# Patient Record
Sex: FEMALE | Race: WHITE | Employment: FULL TIME | RURAL
[De-identification: names, ages, dates, MRNs, and addresses within clinical notes are randomized per-mention and may not be internally consistent; named-entity substitution may affect disease eponyms.]

---

## 2017-01-03 ENCOUNTER — TELEPHONE (OUTPATIENT)
Dept: FAMILY MEDICINE CLINIC | Age: 62
End: 2017-01-03

## 2017-01-03 ENCOUNTER — OFFICE VISIT (OUTPATIENT)
Dept: FAMILY MEDICINE CLINIC | Age: 62
End: 2017-01-03

## 2017-01-03 VITALS
HEART RATE: 70 BPM | BODY MASS INDEX: 20.62 KG/M2 | OXYGEN SATURATION: 97 % | HEIGHT: 64 IN | SYSTOLIC BLOOD PRESSURE: 115 MMHG | DIASTOLIC BLOOD PRESSURE: 53 MMHG | WEIGHT: 120.8 LBS | RESPIRATION RATE: 18 BRPM | TEMPERATURE: 96.7 F

## 2017-01-03 DIAGNOSIS — R31.9 HEMATURIA: Primary | ICD-10-CM

## 2017-01-03 DIAGNOSIS — G47.00 INSOMNIA, UNSPECIFIED TYPE: ICD-10-CM

## 2017-01-03 DIAGNOSIS — J30.89 PERENNIAL ALLERGIC RHINITIS, UNSPECIFIED ALLERGIC RHINITIS TRIGGER: ICD-10-CM

## 2017-01-03 DIAGNOSIS — M81.0 OSTEOPOROSIS: ICD-10-CM

## 2017-01-03 DIAGNOSIS — I10 ESSENTIAL HYPERTENSION: ICD-10-CM

## 2017-01-03 DIAGNOSIS — Z12.11 COLON CANCER SCREENING: ICD-10-CM

## 2017-01-03 DIAGNOSIS — E55.9 VITAMIN D DEFICIENCY: ICD-10-CM

## 2017-01-03 DIAGNOSIS — E78.5 HYPERLIPIDEMIA, UNSPECIFIED HYPERLIPIDEMIA TYPE: ICD-10-CM

## 2017-01-03 DIAGNOSIS — R31.9 BLOOD IN URINE: Primary | ICD-10-CM

## 2017-01-03 LAB
BILIRUB UR QL STRIP: NEGATIVE
GLUCOSE UR-MCNC: NEGATIVE MG/DL
KETONES P FAST UR STRIP-MCNC: NEGATIVE MG/DL
PH UR STRIP: 6.5 [PH] (ref 4.6–8)
PROT UR QL STRIP: NEGATIVE MG/DL
SP GR UR STRIP: 1.02 (ref 1–1.03)
UA UROBILINOGEN AMB POC: NORMAL (ref 0.2–1)
URINALYSIS CLARITY POC: CLEAR
URINALYSIS COLOR POC: NORMAL
URINE BLOOD POC: NORMAL
URINE LEUKOCYTES POC: NEGATIVE
URINE NITRITES POC: NEGATIVE

## 2017-01-03 RX ORDER — FLUTICASONE PROPIONATE 50 MCG
SPRAY, SUSPENSION (ML) NASAL
Qty: 3 BOTTLE | Refills: 5 | Status: SHIPPED | OUTPATIENT
Start: 2017-01-03 | End: 2018-11-27 | Stop reason: SDUPTHER

## 2017-01-03 RX ORDER — TRAZODONE HYDROCHLORIDE 50 MG/1
50 TABLET ORAL
Qty: 30 TAB | Refills: 5 | Status: SHIPPED | OUTPATIENT
Start: 2017-01-03 | End: 2017-07-21 | Stop reason: SDUPTHER

## 2017-01-03 NOTE — PROGRESS NOTES
Chief Complaint   Patient presents with    Blood in Urine     patient has no symptoms, but has blood in urine     Labs     patient needs labs for up coming annual visit later in the month     Patient is fasting today for labs. Mckay Medina LPN

## 2017-01-03 NOTE — PROGRESS NOTES
HISTORY OF PRESENT ILLNESS  Thu Stevens is a 59 y.o. female. Chief Complaint   Patient presents with    Blood in Urine     patient has no symptoms, but has blood in urine     Labs     patient needs labs for up coming annual visit later in the month       HPI  Seeing Dr Zara Primrose Urologist in [place] for years  Had frequent UTI's in the past  Was on Macrobid every day then  Got off    Did not have any sy  But had BW done for [employer]  BW good for Chol  But urine had Leukocytes and blood in it     Here for recheck  And was told to get a CT scan  From new Urologist  But did not get ABX    Also her for rest of BW    Osteoporosis  Not taking Boniva  Scared of SE      Review of Systems   Constitutional: Negative for fever and weight loss. Respiratory: Negative for cough and shortness of breath. Gastrointestinal: Negative for abdominal pain, blood in stool, constipation, diarrhea, melena, nausea and vomiting. Genitourinary: Positive for flank pain. Negative for dysuria, frequency, hematuria and urgency. No vaginal bleeding     Past Medical History   Diagnosis Date    BCC (basal cell carcinoma of skin)     Cervical stenosis of spinal canal     Chronic UTI     Hypercholesterolemia      Hyperlipidemia    Hypertension     MVP (mitral valve prolapse)      Tricuspid regurgitation    Osteoporosis 2015.01.02     bone density exam     Current Outpatient Prescriptions   Medication Sig Dispense Refill    traZODone (DESYREL) 50 mg tablet Take 1 Tab by mouth nightly. 30 Tab 5    fluticasone (FLONASE) 50 mcg/actuation nasal spray 2 spray each nostril daily 3 Bottle 5    metoprolol succinate (TOPROL-XL) 25 mg XL tablet Take 1 Tab by mouth daily. 90 Tab 2    simvastatin (ZOCOR) 10 mg tablet Take 1 Tab by mouth nightly. 90 Tab 0    lisinopril (PRINIVIL, ZESTRIL) 5 mg tablet Take 5 mg by mouth.  multivitamin (ONE A DAY) tablet Take 1 tablet by mouth daily.       zolpidem (AMBIEN) 5 mg tablet 1/2 - 1 qhs prn 30 tablet 0    ibandronate (BONIVA) 150 mg tablet Take 150 mg by mouth every thirty (30) days. 3 Tab 3     No Known Allergies  Visit Vitals    /53 (BP 1 Location: Left arm, BP Patient Position: Sitting)    Pulse 70    Temp 96.7 °F (35.9 °C) (Temporal)    Resp 18    Ht 5' 4\" (1.626 m)    Wt 120 lb 12.8 oz (54.8 kg)    SpO2 97%    BMI 20.74 kg/m2       Physical Exam   Constitutional: She is oriented to person, place, and time. She appears well-developed and well-nourished. No distress. HENT:   Head: Normocephalic and atraumatic. Mouth/Throat: Oropharynx is clear and moist. No oropharyngeal exudate. Eyes: Conjunctivae and EOM are normal.   Cardiovascular: Normal rate and regular rhythm. Pulmonary/Chest: Effort normal and breath sounds normal.   Abdominal: She exhibits no distension. There is no tenderness. Musculoskeletal: She exhibits no edema. Lymphadenopathy:     She has no cervical adenopathy. Neurological: She is alert and oriented to person, place, and time. Skin: Skin is warm and dry. Psychiatric: She has a normal mood and affect. Nursing note and vitals reviewed. Recent Results (from the past 12 hour(s))   AMB POC URINALYSIS DIP STICK AUTO W/O MICRO    Collection Time: 01/03/17  9:27 AM   Result Value Ref Range    Color (UA POC) Dark Yellow     Clarity (UA POC) Clear     Glucose (UA POC) Negative Negative    Bilirubin (UA POC) Negative Negative    Ketones (UA POC) Negative Negative    Specific gravity (UA POC) 1.020 1.001 - 1.035    Blood (UA POC) Trace Negative    pH (UA POC) 6.5 4.6 - 8.0    Protein (UA POC) Negative Negative mg/dL    Urobilinogen (UA POC) 0.2 mg/dL 0.2 - 1    Nitrites (UA POC) Negative Negative    Leukocyte esterase (UA POC) Negative Negative       ASSESSMENT and PLAN    ICD-10-CM ICD-9-CM    1. Blood in urine R31.9 599.70 AMB POC URINALYSIS DIP STICK AUTO W/O MICRO      CT ABD PELV WO CONT      CULTURE, URINE   2.  Essential hypertension I10 144.6 METABOLIC PANEL, BASIC      CBC WITH AUTOMATED DIFF   3. Hyperlipidemia, unspecified hyperlipidemia type E78.5 272.4    4. Vitamin D deficiency E55.9 268.9 VITAMIN D, 25 HYDROXY   5. Insomnia, unspecified type G47.00 780.52 traZODone (DESYREL) 50 mg tablet   6. Perennial allergic rhinitis, unspecified allergic rhinitis trigger J30.89 477.8 fluticasone (FLONASE) 50 mcg/actuation nasal spray   7. Osteoporosis M81.0 733.00 DEXA BONE DENSITY STUDY AXIAL   8.  Colon cancer screening Z12.11 V76.51 OCCULT BLOOD, IMMUNOASSAY (FIT)

## 2017-01-03 NOTE — MR AVS SNAPSHOT
Visit Information Date & Time Provider Department Dept. Phone Encounter #  
 1/3/2017  9:20 AM Yanet Dial MD [address] [place] [address] [phone] [id number] Your Appointments 1/26/2017  8:00 AM  
ESTABLISHED PATIENT with Yanet Dial MD  
[address] [place] [address] ([facility]) Appt Note: hm due $25 CP mbm  
 [address] [facility] [address] [facility] [address]  [phone]  
[phone]  
  
   
 [address] [address] Upcoming Health Maintenance Date Due FOBT Q 1 YEAR AGE 50-75 11/23/2016 ZOSTER VACCINE AGE 60> 6/24/2017* BREAST CANCER SCRN MAMMOGRAM 8/25/2017 PAP AKA CERVICAL CYTOLOGY 10/28/2018 DTaP/Tdap/Td series (2 - Td) 11/19/2025 *Topic was postponed. The date shown is not the original due date. Allergies as of 1/3/2017  Review Complete On: 1/3/2017 By: Ace Joseph LPN No Known Allergies Current Immunizations  Reviewed on 11/19/2015 Name Date Td 6/27/2005 Tdap 11/19/2015 Not reviewed this visit You Were Diagnosed With   
  
 Codes Comments Blood in urine    -  Primary ICD-10-CM: R31.9 ICD-9-CM: 599.70 Essential hypertension     ICD-10-CM: I10 
ICD-9-CM: 401.9 Hyperlipidemia, unspecified hyperlipidemia type     ICD-10-CM: E78.5 ICD-9-CM: 272.4 Vitamin D deficiency     ICD-10-CM: E55.9 ICD-9-CM: 268.9 Insomnia, unspecified type     ICD-10-CM: G47.00 ICD-9-CM: 780.52 Perennial allergic rhinitis, unspecified allergic rhinitis trigger     ICD-10-CM: J30.89 ICD-9-CM: 477.8 Osteoporosis     ICD-10-CM: M81.0 ICD-9-CM: 733.00 Colon cancer screening     ICD-10-CM: Z12.11 ICD-9-CM: V76.51 Vitals BP Pulse Temp Resp Height(growth percentile) 115/53 (BP 1 Location: Left arm, BP Patient Position: Sitting) 70 96.7 °F (35.9 °C) (Temporal) 18 5' 4\" (1.626 m) Weight(growth percentile) SpO2 BMI OB Status Smoking Status 120 lb 12.8 oz (54.8 kg) 97% 20.74 kg/m2 Postmenopausal Never Smoker Vitals History BMI and BSA Data Body Mass Index Body Surface Area 20.74 kg/m 2 1.57 m 2 Preferred Pharmacy Pharmacy Name Phone [address] [place] [address],[address] [place] [address]  [phone] Your Updated Medication List  
  
   
This list is accurate as of: 1/3/17 10:25 AM.  Always use your most recent med list.  
  
  
  
  
 fluticasone 50 mcg/actuation nasal spray Commonly known as:  FLONASE  
2 spray each nostril daily  
  
 ibandronate 150 mg tablet Commonly known as:  Wayne Night Take 150 mg by mouth every thirty (30) days. lisinopril 5 mg tablet Commonly known as:  Maurizio Reed Take 5 mg by mouth.  
  
 metoprolol succinate 25 mg XL tablet Commonly known as:  TOPROL-XL Take 1 Tab by mouth daily. multivitamin tablet Commonly known as:  ONE A DAY Take 1 tablet by mouth daily. simvastatin 10 mg tablet Commonly known as:  ZOCOR Take 1 Tab by mouth nightly. traZODone 50 mg tablet Commonly known as:  [place] Askew Take 1 Tab by mouth nightly. zolpidem 5 mg tablet Commonly known as:  AMBIEN  
1/2 - 1 qhs prn  
  
  
  
  
Prescriptions Sent to Pharmacy Refills  
 traZODone (DESYREL) 50 mg tablet 5 Sig: Take 1 Tab by mouth nightly. Class: Normal  
 Pharmacy: [address] [place] [address]  Ph #: [phone] Route: Oral  
 fluticasone (FLONASE) 50 mcg/actuation nasal spray 5 Si spray each nostril daily Class: Normal  
 Pharmacy: [address] [place] [address] Dr Ph #: [phone] To-Do List   
 2017 Imaging:  CT ABD PELV WO CONT   
  
 2017 Imaging:  DEXA BONE DENSITY STUDY AXIAL   
  
 2017 Lab:  OCCULT BLOOD, IMMUNOASSAY (FIT) Introducing [facility] & HEALTH SERVICES!    
 [facility] introduces [email or web address] patient portal. Now you can access parts of your medical record, email your doctor's office, and request medication refills online. 1. In your internet browser, go to [email or web address] 2. Click on the First Time User? Click Here link in the Sign In box. You will see the New Member Sign Up page. 3. Enter your [employer] Access Code exactly as it appears below. You will not need to use this code after youve completed the sign-up process. If you do not sign up before the expiration date, you must request a new code. · [employer] Access Code: [email or web address] Expires: 4/3/2017  8:59 AM 
 
4. Enter the last four digits of your Social Security Number (xxxx) and Date of Birth (mm/dd/yyyy) as indicated and click Submit. You will be taken to the next sign-up page. 5. Create a [employer] ID. This will be your [employer] login ID and cannot be changed, so think of one that is secure and easy to remember. 6. Create a [employer] password. You can change your password at any time. 7. Enter your Password Reset Question and Answer. This can be used at a later time if you forget your password. 8. Enter your e-mail address. You will receive e-mail notification when new information is available in [address] 9. Click Sign Up. You can now view and download portions of your medical record. 10. Click the Download Summary menu link to download a portable copy of your medical information. If you have questions, please visit the Frequently Asked Questions section of the [employer] website. Remember, [employer] is NOT to be used for urgent needs. For medical emergencies, dial 911. Now available from your iPhone and Android! Please provide this summary of care documentation to your next provider. Your primary care clinician is listed as Terri Durán. If you have any questions after today's visit, please call [phone].

## 2017-01-04 LAB
25(OH)D3+25(OH)D2 SERPL-MCNC: 40.3 NG/ML (ref 30–100)
BASOPHILS # BLD AUTO: 0 X10E3/UL (ref 0–0.2)
BASOPHILS NFR BLD AUTO: 1 %
BUN SERPL-MCNC: 10 MG/DL (ref 8–27)
BUN/CREAT SERPL: 15 (ref 11–26)
CALCIUM SERPL-MCNC: 8.8 MG/DL (ref 8.7–10.3)
CHLORIDE SERPL-SCNC: 103 MMOL/L (ref 96–106)
CO2 SERPL-SCNC: 24 MMOL/L (ref 18–29)
CREAT SERPL-MCNC: 0.65 MG/DL (ref 0.57–1)
EOSINOPHIL # BLD AUTO: 0.2 X10E3/UL (ref 0–0.4)
EOSINOPHIL NFR BLD AUTO: 4 %
ERYTHROCYTE [DISTWIDTH] IN BLOOD BY AUTOMATED COUNT: 13.4 % (ref 12.3–15.4)
GLUCOSE SERPL-MCNC: 101 MG/DL (ref 65–99)
HCT VFR BLD AUTO: 40.6 % (ref 34–46.6)
HGB BLD-MCNC: 13.8 G/DL (ref 11.1–15.9)
IMM GRANULOCYTES # BLD: 0 X10E3/UL (ref 0–0.1)
IMM GRANULOCYTES NFR BLD: 0 %
LYMPHOCYTES # BLD AUTO: 1.6 X10E3/UL (ref 0.7–3.1)
LYMPHOCYTES NFR BLD AUTO: 36 %
MCH RBC QN AUTO: 31.4 PG (ref 26.6–33)
MCHC RBC AUTO-ENTMCNC: 34 G/DL (ref 31.5–35.7)
MCV RBC AUTO: 93 FL (ref 79–97)
MONOCYTES # BLD AUTO: 0.5 X10E3/UL (ref 0.1–0.9)
MONOCYTES NFR BLD AUTO: 11 %
NEUTROPHILS # BLD AUTO: 2.2 X10E3/UL (ref 1.4–7)
NEUTROPHILS NFR BLD AUTO: 48 %
PLATELET # BLD AUTO: 178 X10E3/UL (ref 150–379)
POTASSIUM SERPL-SCNC: 5.2 MMOL/L (ref 3.5–5.2)
RBC # BLD AUTO: 4.39 X10E6/UL (ref 3.77–5.28)
SODIUM SERPL-SCNC: 142 MMOL/L (ref 134–144)
WBC # BLD AUTO: 4.6 X10E3/UL (ref 3.4–10.8)

## 2017-01-04 NOTE — PROGRESS NOTES
Call pt, the kidney tests and electrolytes are  Normal  The CBC is normal  The Vit D is normal  Cont current meds, recheck in 1 y

## 2017-01-05 ENCOUNTER — TELEPHONE (OUTPATIENT)
Dept: FAMILY MEDICINE CLINIC | Age: 62
End: 2017-01-05

## 2017-01-06 DIAGNOSIS — R31.9 BLOOD IN URINE: ICD-10-CM

## 2017-01-06 LAB
BACTERIA UR CULT: ABNORMAL
BACTERIA UR CULT: ABNORMAL

## 2017-01-06 RX ORDER — AMOXICILLIN 875 MG/1
875 TABLET, FILM COATED ORAL 2 TIMES DAILY
Qty: 14 TAB | Refills: 0 | Status: SHIPPED | OUTPATIENT
Start: 2017-01-06 | End: 2017-04-18 | Stop reason: ALTCHOICE

## 2017-01-11 DIAGNOSIS — M81.0 OSTEOPOROSIS: ICD-10-CM

## 2017-04-18 ENCOUNTER — OFFICE VISIT (OUTPATIENT)
Dept: FAMILY MEDICINE CLINIC | Age: 62
End: 2017-04-18

## 2017-04-18 VITALS
OXYGEN SATURATION: 98 % | BODY MASS INDEX: 20.32 KG/M2 | WEIGHT: 119 LBS | DIASTOLIC BLOOD PRESSURE: 75 MMHG | HEIGHT: 64 IN | RESPIRATION RATE: 14 BRPM | TEMPERATURE: 96 F | HEART RATE: 55 BPM | SYSTOLIC BLOOD PRESSURE: 128 MMHG

## 2017-04-18 DIAGNOSIS — H11.32 SUBCONJUNCTIVAL HEMORRHAGE, NON-TRAUMATIC, LEFT: Primary | ICD-10-CM

## 2017-04-18 DIAGNOSIS — G47.00 INSOMNIA, UNSPECIFIED TYPE: ICD-10-CM

## 2017-04-18 DIAGNOSIS — G47.25 JET LAG: ICD-10-CM

## 2017-04-18 DIAGNOSIS — H57.89 REDNESS OF EYE, LEFT: ICD-10-CM

## 2017-04-18 RX ORDER — ZOLPIDEM TARTRATE 5 MG/1
TABLET ORAL
Qty: 30 TAB | Refills: 0 | Status: SHIPPED | OUTPATIENT
Start: 2017-04-18 | End: 2017-11-10 | Stop reason: SDUPTHER

## 2017-04-18 RX ORDER — CIPROFLOXACIN HYDROCHLORIDE 3.5 MG/ML
SOLUTION/ DROPS TOPICAL
Qty: 5 ML | Refills: 0 | Status: SHIPPED | OUTPATIENT
Start: 2017-04-18 | End: 2017-11-10 | Stop reason: ALTCHOICE

## 2017-04-18 NOTE — MR AVS SNAPSHOT
Visit Information Date & Time Provider Department Dept. Phone Encounter #  
 4/18/2017  1:30 PM Shahid Hampton PA-C [address] [id number] Upcoming Health Maintenance Date Due FOBT Q 1 YEAR AGE 50-75 11/23/2016 ZOSTER VACCINE AGE 60> 6/24/2017* BREAST CANCER SCRN MAMMOGRAM 8/25/2017 PAP AKA CERVICAL CYTOLOGY 10/28/2018 DTaP/Tdap/Td series (2 - Td) 11/19/2025 *Topic was postponed. The date shown is not the original due date. Allergies as of 4/18/2017  Review Complete On: 4/18/2017 By: Shahid Hampton PA-C No Known Allergies Current Immunizations  Reviewed on 11/19/2015 Name Date Td 6/27/2005 Tdap 11/19/2015 Not reviewed this visit You Were Diagnosed With   
  
 Codes Comments Subconjunctival hemorrhage, non-traumatic, left    -  Primary ICD-10-CM: H11.32 
ICD-9-CM: 372.72 Redness of eye, left     ICD-10-CM: H57.8 ICD-9-CM: 379.93   
 Jet lag     ICD-10-CM: G47.25 ICD-9-CM: 327.35 Insomnia, unspecified type     ICD-10-CM: G47.00 ICD-9-CM: 780.52 Vitals BP Pulse Temp Resp Height(growth percentile) Weight(growth percentile) 128/75 (BP 1 Location: Right arm, BP Patient Position: Sitting) (!) 55 96 °F (35.6 °C) (Temporal) 14 5' 4\" (1.626 m) 119 lb (54 kg) SpO2 BMI OB Status Smoking Status 98% 20.43 kg/m2 Postmenopausal Never Smoker BMI and BSA Data Body Mass Index Body Surface Area  
 20.43 kg/m 2 1.56 m 2 Preferred Pharmacy Pharmacy Name Phone [facility],[address], [address] [place] [address]  [phone] Your Updated Medication List  
  
   
This list is accurate as of: 4/18/17  1:54 PM.  Always use your most recent med list.  
  
  
  
  
 ciprofloxacin HCl 0.3 % ophthalmic solution Commonly known as:  CILOXIN  
2 gtt in eye(s) q2h while awake x 2 days, then q4h x 5 days. fluticasone 50 mcg/actuation nasal spray Commonly known as:  FLONASE  
2 spray each nostril daily  
  
 ibandronate 150 mg tablet Commonly known as:  Polina Loni Take 150 mg by mouth every thirty (30) days. lisinopril 5 mg tablet Commonly known as:  Bret Daft Take 5 mg by mouth.  
  
 metoprolol succinate 25 mg XL tablet Commonly known as:  TOPROL-XL Take 1 Tab by mouth daily. multivitamin tablet Commonly known as:  ONE A DAY Take 1 tablet by mouth daily. simvastatin 10 mg tablet Commonly known as:  ZOCOR Take 1 Tab by mouth nightly. traZODone 50 mg tablet Commonly known as:  Rajiv [place] Take 1 Tab by mouth nightly. zolpidem 5 mg tablet Commonly known as:  AMBIEN  
1/2 - 1 qhs prn  
  
  
  
  
Prescriptions Printed Refills  
 zolpidem (AMBIEN) 5 mg tablet 0 Si/2 - 1 qhs prn Class: Print Prescriptions Sent to Pharmacy Refills  
 ciprofloxacin HCl (CILOXIN) 0.3 % ophthalmic solution 0 Si gtt in eye(s) q2h while awake x 2 days, then q4h x 5 days. Class: Normal  
 Pharmacy: [address] [place] [address],[address], [address] [place] [address] Dr Amador #: [phone] Patient Instructions Insomnia: Care Instructions Your Care Instructions Insomnia is the inability to sleep well. It is a common problem for most people at some time. Insomnia may make it hard for you to get to sleep, stay asleep, or sleep as long as you need to. This can make you tired and grouchy during the day. It can also make you forgetful, less effective at work, and unhappy. Insomnia can be caused by conditions such as depression or anxiety. Pain can also affect your ability to sleep. When these problems are solved, the insomnia usually clears up. But sometimes bad sleep habits can cause insomnia. If insomnia is affecting your work or your enjoyment of life, you can take steps to improve your sleep. Follow-up care is a key part of your treatment and safety.  Be sure to make and go to all appointments, and call your doctor if you are having problems. It's also a good idea to know your test results and keep a list of the medicines you take. How can you care for yourself at home? What to avoid · Do not have drinks with caffeine, such as coffee or black tea, for 8 hours before bed. · Do not smoke or use other types of tobacco near bedtime. Nicotine is a stimulant and can keep you awake. · Avoid drinking alcohol late in the evening, because it can cause you to wake in the middle of the night. · Do not eat a big meal close to bedtime. If you are hungry, eat a light snack. · Do not drink a lot of water close to bedtime, because the need to urinate may wake you up during the night. · Do not read or watch TV in bed. Use the bed only for sleeping and sexual activity. What to try · Go to bed at the same time every night, and wake up at the same time every morning. Do not take naps during the day. · Keep your bedroom quiet, dark, and cool. · Sleep on a comfortable pillow and mattress. · If watching the clock makes you anxious, turn it facing away from you so you cannot see the time. · If you worry when you lie down, start a worry book. Well before bedtime, write down your worries, and then set the book and your concerns aside. · Try meditation or other relaxation techniques before you go to bed. · If you cannot fall asleep, get up and go to another room until you feel sleepy. Do something relaxing. Repeat your bedtime routine before you go to bed again. · Make your house quiet and calm about an hour before bedtime. Turn down the lights, turn off the TV, log off the computer, and turn down the volume on music. This can help you relax after a busy day. When should you call for help? Watch closely for changes in your health, and be sure to contact your doctor if: 
· Your efforts to improve your sleep do not work. · Your insomnia gets worse. · You have been feeling down, depressed, or hopeless or have lost interest in things that you usually enjoy. Where can you learn more? Go to http://atul-chika.info/. Enter P513 in the search box to learn more about \"Insomnia: Care Instructions. \" Current as of: July 26, 2016 Content Version: 11.2 © 3120-1783 [employer]. Care instructions adapted under license by [employer] (which disclaims liability or warranty for this information). If you have questions about a medical condition or this instruction, always ask your healthcare professional. [place] [address] any warranty or liability for your use of this information. Subconjunctival Hemorrhage: Care Instructions Your Care Instructions Sometimes small blood vessels in the white of the eye can break, causing a red spot or speck. This is called a subconjunctival hemorrhage. The blood vessels may break when you sneeze, cough, vomit, strain, or bend over. Sometimes there is no clear cause. The blood may look alarming, especially if the spot is large. If there is no pain or vision change, there is usually no reason to worry, and the blood slowly will go away on its own in 2 to 3 weeks. Follow-up care is a key part of your treatment and safety. Be sure to make and go to all appointments, and call your doctor if you are having problems. Its also a good idea to know your test results and keep a list of the medicines you take. How can you care for yourself at home? · Watch for changes in your eye. It is normal for the red spot on your eyeball to change color as it heals. Just like a bruise on your skin, it may change from red to brown to purple to yellow. · Do not take aspirin or products that contain aspirin, which can increase bleeding. Use acetaminophen (Tylenol) if you need pain relief for another problem.  
· Do not take two or more pain medicines at the same time unless the doctor told you to. Many pain medicines have acetaminophen, which is Tylenol. Too much acetaminophen (Tylenol) can be harmful. When should you call for help? Call your doctor now or seek immediate medical care if: 
· You see blood over the black part of your eye (pupil). · You have any changes or problems in your vision. · You have any pain in your eye. · You have any discharge from your eye. Watch closely for changes in your health, and be sure to contact your doctor if: 
· Your eye color is not steadily returning to normal. 
· The blood has not gone away after 2 to 3 weeks. · You develop bruising or bleeding elsewhere, such as the gums or the skin, or you have nosebleeds. Where can you learn more? Go to http://atul-chika.info/. Enter K224 in the search box to learn more about \"Subconjunctival Hemorrhage: Care Instructions. \" Current as of: May 23, 2016 Content Version: 11.2 © 8195-8676 [employer]. Care instructions adapted under license by [employer] (which disclaims liability or warranty for this information). If you have questions about a medical condition or this instruction, always ask your healthcare professional. [place] [address] any warranty or liability for your use of this information. Introducing [facility] & HEALTH SERVICES! [facility] introduces iWant[employer] patient portal. Now you can access parts of your medical record, email your doctor's office, and request medication refills online. 1. In your internet browser, go to [email or web address] 2. Click on the First Time User? Click Here link in the Sign In box. You will see the New Member Sign Up page. 3. Enter your [employer] Access Code exactly as it appears below. You will not need to use this code after youve completed the sign-up process. If you do not sign up before the expiration date, you must request a new code. · [employer] Access Code: [email or web address] Expires: 7/17/2017  1:54 PM 
 
4. Enter the last four digits of your Social Security Number (xxxx) and Date of Birth (mm/dd/yyyy) as indicated and click Submit. You will be taken to the next sign-up page. 5. Create a [employer] ID. This will be your [employer] login ID and cannot be changed, so think of one that is secure and easy to remember. 6. Create a [employer] password. You can change your password at any time. 7. Enter your Password Reset Question and Answer. This can be used at a later time if you forget your password. 8. Enter your e-mail address. You will receive e-mail notification when new information is available in [address] 9. Click Sign Up. You can now view and download portions of your medical record. 10. Click the Download Summary menu link to download a portable copy of your medical information. If you have questions, please visit the Frequently Asked Questions section of the [employer] website. Remember, [employer] is NOT to be used for urgent needs. For medical emergencies, dial 911. Now available from your iPhone and Android! Please provide this summary of care documentation to your next provider. Your primary care clinician is listed as Terri Durán. If you have any questions after today's visit, please call [phone].

## 2017-04-18 NOTE — PATIENT INSTRUCTIONS
Insomnia: Care Instructions  Your Care Instructions  Insomnia is the inability to sleep well. It is a common problem for most people at some time. Insomnia may make it hard for you to get to sleep, stay asleep, or sleep as long as you need to. This can make you tired and grouchy during the day. It can also make you forgetful, less effective at work, and unhappy. Insomnia can be caused by conditions such as depression or anxiety. Pain can also affect your ability to sleep. When these problems are solved, the insomnia usually clears up. But sometimes bad sleep habits can cause insomnia. If insomnia is affecting your work or your enjoyment of life, you can take steps to improve your sleep. Follow-up care is a key part of your treatment and safety. Be sure to make and go to all appointments, and call your doctor if you are having problems. It's also a good idea to know your test results and keep a list of the medicines you take. How can you care for yourself at home? What to avoid  · Do not have drinks with caffeine, such as coffee or black tea, for 8 hours before bed. · Do not smoke or use other types of tobacco near bedtime. Nicotine is a stimulant and can keep you awake. · Avoid drinking alcohol late in the evening, because it can cause you to wake in the middle of the night. · Do not eat a big meal close to bedtime. If you are hungry, eat a light snack. · Do not drink a lot of water close to bedtime, because the need to urinate may wake you up during the night. · Do not read or watch TV in bed. Use the bed only for sleeping and sexual activity. What to try  · Go to bed at the same time every night, and wake up at the same time every morning. Do not take naps during the day. · Keep your bedroom quiet, dark, and cool. · Sleep on a comfortable pillow and mattress. · If watching the clock makes you anxious, turn it facing away from you so you cannot see the time.   · If you worry when you lie down, start a worry book. Well before bedtime, write down your worries, and then set the book and your concerns aside. · Try meditation or other relaxation techniques before you go to bed. · If you cannot fall asleep, get up and go to another room until you feel sleepy. Do something relaxing. Repeat your bedtime routine before you go to bed again. · Make your house quiet and calm about an hour before bedtime. Turn down the lights, turn off the TV, log off the computer, and turn down the volume on music. This can help you relax after a busy day. When should you call for help? Watch closely for changes in your health, and be sure to contact your doctor if:  · Your efforts to improve your sleep do not work. · Your insomnia gets worse. · You have been feeling down, depressed, or hopeless or have lost interest in things that you usually enjoy. Where can you learn more? Go to http://atulPatiencechika.info/. Enter P513 in the search box to learn more about \"Insomnia: Care Instructions. \"  Current as of: July 26, 2016  Content Version: 11.2  © 0694-2570 [employer]. Care instructions adapted under license by [employer] (which disclaims liability or warranty for this information). If you have questions about a medical condition or this instruction, always ask your healthcare professional. [place] [address] any warranty or liability for your use of this information. Subconjunctival Hemorrhage: Care Instructions  Your Care Instructions    Sometimes small blood vessels in the white of the eye can break, causing a red spot or speck. This is called a subconjunctival hemorrhage. The blood vessels may break when you sneeze, cough, vomit, strain, or bend over. Sometimes there is no clear cause. The blood may look alarming, especially if the spot is large.  If there is no pain or vision change, there is usually no reason to worry, and the blood slowly will go away on its own in 2 to 3 weeks. Follow-up care is a key part of your treatment and safety. Be sure to make and go to all appointments, and call your doctor if you are having problems. Its also a good idea to know your test results and keep a list of the medicines you take. How can you care for yourself at home? · Watch for changes in your eye. It is normal for the red spot on your eyeball to change color as it heals. Just like a bruise on your skin, it may change from red to brown to purple to yellow. · Do not take aspirin or products that contain aspirin, which can increase bleeding. Use acetaminophen (Tylenol) if you need pain relief for another problem. · Do not take two or more pain medicines at the same time unless the doctor told you to. Many pain medicines have acetaminophen, which is Tylenol. Too much acetaminophen (Tylenol) can be harmful. When should you call for help? Call your doctor now or seek immediate medical care if:  · You see blood over the black part of your eye (pupil). · You have any changes or problems in your vision. · You have any pain in your eye. · You have any discharge from your eye. Watch closely for changes in your health, and be sure to contact your doctor if:  · Your eye color is not steadily returning to normal.  · The blood has not gone away after 2 to 3 weeks. · You develop bruising or bleeding elsewhere, such as the gums or the skin, or you have nosebleeds. Where can you learn more? Go to http://atul-chika.info/. Enter E431 in the search box to learn more about \"Subconjunctival Hemorrhage: Care Instructions. \"  Current as of: May 23, 2016  Content Version: 11.2  © 6775-1815 [employer]. Care instructions adapted under license by [employer] (which disclaims liability or warranty for this information).  If you have questions about a medical condition or this instruction, always ask your healthcare professional. Viv[employer] disclaims any warranty or liability for your use of this information.

## 2017-04-18 NOTE — PROGRESS NOTES
Harper Nguyen is a 59 y.o. female who presents to the office today with the following:  Chief Complaint   Patient presents with    Eye Problem     x 1 day, red, swollen       HPI  Assumed allergies, but only one side and does not itch. Some congestion and drainage in sinuses on/off with allergies also. Woke up yesterday and noticed left eye a little pink/yellow, spread slightly today. Says it is just a little uncomfortable, but is not painful. No visual disturbance, watery/discharge, crusts, photophobia. Denies any trauma, chemical, or FB. No FB sensation. Does not wear contact lenses. Denies f/c, HA, ear pain, ST. No other complaints or acute concerns. Is going to [facility] for conference soon. Would also like refill of Ambien. Only requires when has to travel due to jet lag/insomnia. Says she is in bed asleep by 10pm nl at home w/o issues. Only takes half tab, says a whole tab is too strong. Is aware of safety concerns and potential SEs. ROS  See HPI. No Known Allergies    Current Outpatient Prescriptions   Medication Sig    zolpidem (AMBIEN) 5 mg tablet 1/2 - 1 qhs prn    ciprofloxacin HCl (CILOXIN) 0.3 % ophthalmic solution 2 gtt in eye(s) q2h while awake x 2 days, then q4h x 5 days.  traZODone (DESYREL) 50 mg tablet Take 1 Tab by mouth nightly.  fluticasone (FLONASE) 50 mcg/actuation nasal spray 2 spray each nostril daily    metoprolol succinate (TOPROL-XL) 25 mg XL tablet Take 1 Tab by mouth daily.  simvastatin (ZOCOR) 10 mg tablet Take 1 Tab by mouth nightly.  lisinopril (PRINIVIL, ZESTRIL) 5 mg tablet Take 5 mg by mouth.  multivitamin (ONE A DAY) tablet Take 1 tablet by mouth daily.  ibandronate (BONIVA) 150 mg tablet Take 150 mg by mouth every thirty (30) days. No current facility-administered medications for this visit.         Past Medical History:   Diagnosis Date    BCC (basal cell carcinoma of skin)     Cervical stenosis of spinal canal     Chronic UTI     Hypercholesterolemia     Hyperlipidemia    Hypertension     MVP (mitral valve prolapse)     Tricuspid regurgitation    Osteoporosis 2015.01.02    bone density exam       Past Surgical History:   Procedure Laterality Date    CHEST SURGERY PROCEDURE UNLISTED  as infant    pectus excavatum    HX BREAST AUGMENTATION  2003    HX BREAST BIOPSY      HX COLONOSCOPY  08/24/2006    scanned, every 10 y    HX ENDOSCOPY  08/06    HX ORTHOPAEDIC      HX TONSILLECTOMY      NEUROLOGICAL PROCEDURE UNLISTED      cervical disc fusion 1/2006       Social History     Social History    Marital status: UNKNOWN     Spouse name: N/A    Number of children: N/A    Years of education: N/A     Social History Main Topics    Smoking status: Never Smoker    Smokeless tobacco: Never Used    Alcohol use 1.0 oz/week      Comment: social weekender    Drug use: No    Sexual activity: Yes     Partners: Male     Other Topics Concern    None     Social History Narrative    ** Merged History Encounter **            Family History   Problem Relation Age of Onset    Cancer Maternal Grandmother     Cancer Maternal Grandfather     Obesity Mother     Emphysema Father     Diabetes Sister     Diabetes Brother          Physical Exam:  Visit Vitals    /75 (BP 1 Location: Right arm, BP Patient Position: Sitting)    Pulse (!) 55    Temp 96 °F (35.6 °C) (Temporal)    Resp 14    Ht 5' 4\" (1.626 m)    Wt 119 lb (54 kg)    SpO2 98%    BMI 20.43 kg/m2     Physical Exam   Constitutional: She is oriented to person, place, and time and well-developed, well-nourished, and in no distress. HENT:   Head: Normocephalic and atraumatic. Right Ear: Tympanic membrane, external ear and ear canal normal.   Left Ear: Tympanic membrane, external ear and ear canal normal.   Nose: Nose normal. Right sinus exhibits no maxillary sinus tenderness and no frontal sinus tenderness.  Left sinus exhibits no maxillary sinus tenderness and no frontal sinus tenderness. Mouth/Throat: Uvula is midline, oropharynx is clear and moist and mucous membranes are normal.   Some sinus pressure \"tightness\" noted along sides of nose/sinuses, but are nonttp, no swelling/redness. No rash. Eyes: EOM and lids are normal. Pupils are equal, round, and reactive to light. Lids are everted and swept, no foreign bodies found. Right eye exhibits no discharge. Left eye exhibits no chemosis, no discharge, no exudate and no hordeolum. No foreign body present in the left eye. Left conjunctiva has a hemorrhage. No scleral icterus. Exam with woods lamp is unremarkable. No fluorescein uptake. 1 drop of saline eye wash solution with fluor applied. Pt tolerated w/o difficulty. Neck: Normal range of motion. Neck supple. Cardiovascular: Normal rate, regular rhythm, normal heart sounds and intact distal pulses. Pulmonary/Chest: Effort normal and breath sounds normal.   Lymphadenopathy:     She has no cervical adenopathy. Neurological: She is alert and oriented to person, place, and time. Gait normal.   Skin: Skin is warm and dry. Psychiatric: Mood and affect normal.   Nursing note and vitals reviewed. Assessment/Plan:    ICD-10-CM ICD-9-CM    1. Subconjunctival hemorrhage, non-traumatic, left H11.32 372.72    2. Redness of eye, left H57.8 379.93 ciprofloxacin HCl (CILOXIN) 0.3 % ophthalmic solution   3. Jet lag G47.25 327.35 zolpidem (AMBIEN) 5 mg tablet   4. Insomnia, unspecified type G47.00 780.52 zolpidem (AMBIEN) 5 mg tablet     Reassured pt of self-limiting nature of hemorrhage. HO also provided. Avoid nsaids/ASA. Counseled on worsening/new sxs to be aware of, if should dev any to seek medical attn (notably eye pain, visual dist, rash around eye/nose) while away, may rtc in meantime if any sxs persist/worsen/dev new sxs.   Explained low suspicion for bacterial conjunctivitis, but as pt going out of town would like Rx \"just in case\" begin to itch/crust..etc.    Counseled pt on potential medication AEs/interactions. Caution regarding sedation and safety with Ambien. Avoid with etoh/other meds. Take at bedtime. Follow-up Disposition:  Return if symptoms worsen or fail to improve.     Magda Woodall PA-C

## 2017-11-09 RX ORDER — SIMVASTATIN 10 MG/1
10 TABLET, FILM COATED ORAL
Qty: 90 TAB | Refills: 0 | OUTPATIENT
Start: 2017-11-09

## 2017-11-10 ENCOUNTER — OFFICE VISIT (OUTPATIENT)
Dept: FAMILY MEDICINE CLINIC | Age: 62
End: 2017-11-10

## 2017-11-10 VITALS
RESPIRATION RATE: 18 BRPM | SYSTOLIC BLOOD PRESSURE: 124 MMHG | HEART RATE: 50 BPM | OXYGEN SATURATION: 98 % | HEIGHT: 64 IN | BODY MASS INDEX: 21 KG/M2 | DIASTOLIC BLOOD PRESSURE: 74 MMHG | TEMPERATURE: 96.5 F | WEIGHT: 123 LBS

## 2017-11-10 DIAGNOSIS — Z12.39 BREAST CANCER SCREENING: ICD-10-CM

## 2017-11-10 DIAGNOSIS — G47.00 INSOMNIA, UNSPECIFIED TYPE: ICD-10-CM

## 2017-11-10 DIAGNOSIS — I10 ESSENTIAL HYPERTENSION: Primary | ICD-10-CM

## 2017-11-10 DIAGNOSIS — Z12.11 COLON CANCER SCREENING: ICD-10-CM

## 2017-11-10 DIAGNOSIS — E78.5 HYPERLIPIDEMIA, UNSPECIFIED HYPERLIPIDEMIA TYPE: ICD-10-CM

## 2017-11-10 DIAGNOSIS — G47.25 JET LAG: ICD-10-CM

## 2017-11-10 LAB
BILIRUB UR QL STRIP: NEGATIVE
GLUCOSE UR-MCNC: NEGATIVE MG/DL
KETONES P FAST UR STRIP-MCNC: NEGATIVE MG/DL
PH UR STRIP: 7 [PH] (ref 4.6–8)
PROT UR QL STRIP: NORMAL
SP GR UR STRIP: 1.02 (ref 1–1.03)
UA UROBILINOGEN AMB POC: NORMAL (ref 0.2–1)
URINALYSIS CLARITY POC: CLEAR
URINALYSIS COLOR POC: YELLOW
URINE BLOOD POC: NORMAL
URINE LEUKOCYTES POC: NEGATIVE
URINE NITRITES POC: NEGATIVE

## 2017-11-10 RX ORDER — TRAZODONE HYDROCHLORIDE 50 MG/1
50 TABLET ORAL
Qty: 90 TAB | Refills: 1 | Status: SHIPPED | OUTPATIENT
Start: 2017-11-10 | End: 2018-05-21 | Stop reason: SDUPTHER

## 2017-11-10 RX ORDER — SIMVASTATIN 10 MG/1
10 TABLET, FILM COATED ORAL
Qty: 90 TAB | Refills: 1 | Status: SHIPPED | OUTPATIENT
Start: 2017-11-10 | End: 2018-03-24 | Stop reason: SDUPTHER

## 2017-11-10 RX ORDER — FERROUS SULFATE, DRIED 160(50) MG
1 TABLET, EXTENDED RELEASE ORAL 2 TIMES DAILY WITH MEALS
COMMUNITY
End: 2018-07-10

## 2017-11-10 RX ORDER — ZOLPIDEM TARTRATE 5 MG/1
TABLET ORAL
Qty: 30 TAB | Refills: 0 | Status: SHIPPED | OUTPATIENT
Start: 2017-11-10 | End: 2018-07-10 | Stop reason: SDUPTHER

## 2017-11-10 NOTE — PROGRESS NOTES
HISTORY OF PRESENT ILLNESS  Danika Powell is a 59 y.o. female. Chief Complaint   Patient presents with    Medication Refill     is fasting       HPI  HTN  No SE with meds  HR in 50's for years  Tolerating it well    Hyperlipidemia  On Zocor 10, no SE  Fasting for BW today    Insomnia  On Trazodone regularly  Taking Ambien when traveling internationally    On Ca and Vit D chewables  Not taking Boniva    HM reviewed    Review of Systems   Constitutional: Negative for weight loss. Respiratory: Negative for cough and shortness of breath. Cardiovascular: Negative for chest pain, palpitations and leg swelling. Genitourinary: Negative for dysuria, frequency, hematuria and urgency. Musculoskeletal: Positive for joint pain. Neurological: Positive for dizziness (only once in a while for a sec). Negative for headaches. Past Medical History:   Diagnosis Date    BCC (basal cell carcinoma of skin)     Cervical stenosis of spinal canal     Chronic UTI     Hypercholesterolemia     Hyperlipidemia    Hypertension     MVP (mitral valve prolapse)     Tricuspid regurgitation    Osteoporosis 2015.01.02    bone density exam     Current Outpatient Prescriptions   Medication Sig Dispense Refill    zolpidem (AMBIEN) 5 mg tablet 1/2 - 1 qhs prn 30 Tab 0    simvastatin (ZOCOR) 10 mg tablet Take 1 Tab by mouth nightly. 90 Tab 1    traZODone (DESYREL) 50 mg tablet Take 1 Tab by mouth nightly. 90 Tab 1    calcium-vitamin D (OYSTER SHELL) 500 mg(1,250mg) -200 unit per tablet Take 1 Tab by mouth two (2) times daily (with meals).  fluticasone (FLONASE) 50 mcg/actuation nasal spray 2 spray each nostril daily 3 Bottle 5    metoprolol succinate (TOPROL-XL) 25 mg XL tablet Take 1 Tab by mouth daily. 90 Tab 2    lisinopril (PRINIVIL, ZESTRIL) 5 mg tablet Take 5 mg by mouth.  multivitamin (ONE A DAY) tablet Take 1 tablet by mouth daily.       ibandronate (BONIVA) 150 mg tablet Take 150 mg by mouth every thirty (30) days. 3 Tab 3     No Known Allergies  Visit Vitals    /74 (BP 1 Location: Left arm, BP Patient Position: Sitting)    Pulse (!) 50    Temp 96.5 °F (35.8 °C) (Temporal)    Resp 18    Ht 5' 4\" (1.626 m)    Wt 123 lb (55.8 kg)    SpO2 98%    BMI 21.11 kg/m2     Physical Exam   Constitutional: She is oriented to person, place, and time. She appears well-developed and well-nourished. No distress. HENT:   Head: Normocephalic and atraumatic. Right Ear: External ear normal.   Left Ear: External ear normal.   Mouth/Throat: Oropharynx is clear and moist. No oropharyngeal exudate. Eyes: Conjunctivae and EOM are normal. Pupils are equal, round, and reactive to light. Cardiovascular: Normal rate and regular rhythm. Pulmonary/Chest: Effort normal and breath sounds normal.   Musculoskeletal: She exhibits no edema. Lymphadenopathy:     She has no cervical adenopathy. Neurological: She is alert and oriented to person, place, and time. Skin: Skin is warm and dry. Psychiatric: She has a normal mood and affect. Nursing note and vitals reviewed. Recent Results (from the past 12 hour(s))   AMB POC URINALYSIS DIP STICK MANUAL W/O MICRO    Collection Time: 11/10/17  8:33 AM   Result Value Ref Range    Color (UA POC) Yellow Yellow    Clarity (UA POC) Clear Clear    Glucose (UA POC) Negative Negative    Bilirubin (UA POC) Negative Negative    Ketones (UA POC) Negative Negative    Specific gravity (UA POC) 1.025 1.001 - 1.035    Blood (UA POC) Trace Negative    pH (UA POC) 7.0 4.6 - 8.0    Protein (UA POC) Trace Negative    Urobilinogen (UA POC) 0.2 mg/dL 0.2 - 1    Nitrites (UA POC) Negative Negative    Leukocyte esterase (UA POC) Negative Negative       ASSESSMENT and PLAN    ICD-10-CM ICD-9-CM    1. Essential hypertension I10 401.9 CBC WITH AUTOMATED DIFF      METABOLIC PANEL, COMPREHENSIVE      AMB POC URINALYSIS DIP STICK MANUAL W/O MICRO   2.  Hyperlipidemia, unspecified hyperlipidemia type E78.5 272.4 simvastatin (ZOCOR) 10 mg tablet      LIPID PANEL WITH LDL/HDL RATIO      WV HANDLG&/OR CONVEY OF SPEC FOR TR OFFICE TO LAB      WV COLLECTION VENOUS BLOOD,VENIPUNCTURE   3. Jet lag G47.25 327.35 zolpidem (AMBIEN) 5 mg tablet   4. Insomnia, unspecified type G47.00 780.52 traZODone (DESYREL) 50 mg tablet   5.  Colon cancer screening Z12.11 V76.51 REFERRAL TO GENERAL SURGERY   6. Breast cancer screening Z12.31 V76.10 [facility] BI SCREENING INCL CAD

## 2017-11-10 NOTE — MR AVS SNAPSHOT
Visit Information Date & Time Provider Department Dept. Phone Encounter #  
 11/10/2017  7:40 AM David Vernon MD [address] [place] [address] [phone] [id number] Upcoming Health Maintenance Date Due ZOSTER VACCINE AGE 60> 9/18/2013 COLONOSCOPY 8/24/2016 PAP AKA CERVICAL CYTOLOGY 10/28/2018 BREAST CANCER SCRN MAMMOGRAM 11/22/2018 DTaP/Tdap/Td series (2 - Td) 11/19/2025 Allergies as of 11/10/2017  Review Complete On: 11/10/2017 By: Jose Belle LPN No Known Allergies Current Immunizations  Reviewed on 11/19/2015 Name Date Td 6/27/2005 Tdap 11/19/2015 Not reviewed this visit You Were Diagnosed With   
  
 Codes Comments Essential hypertension    -  Primary ICD-10-CM: I10 
ICD-9-CM: 401.9 Hyperlipidemia, unspecified hyperlipidemia type     ICD-10-CM: E78.5 ICD-9-CM: 272.4 Jet lag     ICD-10-CM: V92.83 ICD-9-CM: 327.35 Insomnia, unspecified type     ICD-10-CM: G47.00 ICD-9-CM: 780.52 Colon cancer screening     ICD-10-CM: Z12.11 ICD-9-CM: V76.51 Breast cancer screening     ICD-10-CM: Z12.31 
ICD-9-CM: V76.10 Vitals BP Pulse Temp Resp Height(growth percentile) 124/74 (BP 1 Location: Left arm, BP Patient Position: Sitting) (!) 50 96.5 °F (35.8 °C) (Temporal) 18 5' 4\" (1.626 m) Weight(growth percentile) SpO2 BMI OB Status Smoking Status 123 lb (55.8 kg) 98% 21.11 kg/m2 Postmenopausal Never Smoker BMI and BSA Data Body Mass Index Body Surface Area  
 21.11 kg/m 2 1.59 m 2 Preferred Pharmacy Pharmacy Name Phone [facility],[address], [address] [place] [address]  [phone] Your Updated Medication List  
  
   
This list is accurate as of: 11/10/17  8:37 AM.  Always use your most recent med list.  
  
  
  
  
 calcium-vitamin D 500 mg(1,250mg) -200 unit per tablet Commonly known as:  OYSTER SHELL Take 1 Tab by mouth two (2) times daily (with meals). fluticasone 50 mcg/actuation nasal spray Commonly known as:  FLONASE  
2 spray each nostril daily  
  
 ibandronate 150 mg tablet Commonly known as:  Moreeryn Taylor Take 150 mg by mouth every thirty (30) days. lisinopril 5 mg tablet Commonly known as:  Thersia Kubas Take 5 mg by mouth.  
  
 metoprolol succinate 25 mg XL tablet Commonly known as:  TOPROL-XL Take 1 Tab by mouth daily. multivitamin tablet Commonly known as:  ONE A DAY Take 1 tablet by mouth daily. simvastatin 10 mg tablet Commonly known as:  ZOCOR Take 1 Tab by mouth nightly. traZODone 50 mg tablet Commonly known as:  Madolyn Saas Take 1 Tab by mouth nightly. zolpidem 5 mg tablet Commonly known as:  AMBIEN  
1/2 - 1 qhs prn  
  
  
  
  
Prescriptions Printed Refills  
 zolpidem (AMBIEN) 5 mg tablet 0 Si/2 - 1 qhs prn Class: Print Prescriptions Sent to Pharmacy Refills  
 simvastatin (ZOCOR) 10 mg tablet 1 Sig: Take 1 Tab by mouth nightly. Class: Normal  
 Pharmacy: [address] [place] [address] Dr Ph #: [phone] Route: Oral  
 traZODone (DESYREL) 50 mg tablet 1 Sig: Take 1 Tab by mouth nightly. Class: Normal  
 Pharmacy: [address] [place] [address] Dr Ph #: [phone] Route: Oral  
  
We Performed the Following AMB POC URINALYSIS DIP STICK MANUAL W/O MICRO [15098 CPT(R)] CBC WITH AUTOMATED DIFF [18314 CPT(R)] LIPID PANEL WITH LDL/HDL RATIO [29583 CPT(R)] METABOLIC PANEL, COMPREHENSIVE [89933 CPT(R)] AL COLLECTION VENOUS BLOOD,VENIPUNCTURE [id number] CPT(R)] AL HANDLG&/OR CONVEY OF SPEC FOR TR OFFICE TO LAB [87769 CPT(R)] REFERRAL TO GENERAL SURGERY [REF27 Custom] Comments:  
 Please evaluate patient for Colonoscopy. To-Do List   
 12/10/2017 Imaging:  [facility] BI SCREENING INCL CAD Referral Information Referral ID Referred By Referred To [id number] [facility], Joelle Forbes MD   
   [facility] [address] [place], [address] Phone: [phone] Fax: [phone] Visits Status Start Date End Date 1 New Request 11/10/17 11/10/18 If your referral has a status of pending review or denied, additional information will be sent to support the outcome of this decision. Introducing [facility]! Abdoulaye Campbell introduces BloggersBase patient portal. Now you can access parts of your medical record, email your doctor's office, and request medication refills online. 1. In your internet browser, go to [email or web address] 2. Click on the First Time User? Click Here link in the Sign In box. You will see the New Member Sign Up page. 3. Enter your BloggersBase Access Code exactly as it appears below. You will not need to use this code after youve completed the sign-up process. If you do not sign up before the expiration date, you must request a new code. · BloggersBase Access Code: [facility] Expires: 2/8/2018  8:37 AM 
 
4. Enter the last four digits of your Social Security Number (xxxx) and Date of Birth (mm/dd/yyyy) as indicated and click Submit. You will be taken to the next sign-up page. 5. Create a BloggersBase ID. This will be your BloggersBase login ID and cannot be changed, so think of one that is secure and easy to remember. 6. Create a [employer]Base password. You can change your password at any time. 7. Enter your Password Reset Question and Answer. This can be used at a later time if you forget your password. 8. Enter your e-mail address. You will receive e-mail notification when new information is available in [address] 9. Click Sign Up. You can now view and download portions of your medical record. 10. Click the Download Summary menu link to download a portable copy of your medical information.  
 
If you have questions, please visit the Frequently Asked Questions section of the [employer]. Remember, finalsitehart is NOT to be used for urgent needs. For medical emergencies, dial 911. Now available from your iPhone and Android! Please provide this summary of care documentation to your next provider. Your primary care clinician is listed as Terri Durán. If you have any questions after today's visit, please call [phone].

## 2017-11-11 LAB
ALBUMIN SERPL-MCNC: 4.2 G/DL (ref 3.6–4.8)
ALBUMIN/GLOB SERPL: 1.7 {RATIO} (ref 1.2–2.2)
ALP SERPL-CCNC: 75 IU/L (ref 39–117)
ALT SERPL-CCNC: 11 IU/L (ref 0–32)
AST SERPL-CCNC: 23 IU/L (ref 0–40)
BASOPHILS # BLD AUTO: 0 X10E3/UL (ref 0–0.2)
BASOPHILS NFR BLD AUTO: 1 %
BILIRUB SERPL-MCNC: 0.4 MG/DL (ref 0–1.2)
BUN SERPL-MCNC: 15 MG/DL (ref 8–27)
BUN/CREAT SERPL: 24 (ref 12–28)
CALCIUM SERPL-MCNC: 8.9 MG/DL (ref 8.7–10.3)
CHLORIDE SERPL-SCNC: 103 MMOL/L (ref 96–106)
CHOLEST SERPL-MCNC: 167 MG/DL (ref 100–199)
CO2 SERPL-SCNC: 27 MMOL/L (ref 18–29)
CREAT SERPL-MCNC: 0.63 MG/DL (ref 0.57–1)
EOSINOPHIL # BLD AUTO: 0.1 X10E3/UL (ref 0–0.4)
EOSINOPHIL NFR BLD AUTO: 3 %
ERYTHROCYTE [DISTWIDTH] IN BLOOD BY AUTOMATED COUNT: 13.3 % (ref 12.3–15.4)
GFR SERPLBLD CREATININE-BSD FMLA CKD-EPI: 110 ML/MIN/1.73
GFR SERPLBLD CREATININE-BSD FMLA CKD-EPI: 96 ML/MIN/1.73
GLOBULIN SER CALC-MCNC: 2.5 G/DL (ref 1.5–4.5)
GLUCOSE SERPL-MCNC: 102 MG/DL (ref 65–99)
HCT VFR BLD AUTO: 40.6 % (ref 34–46.6)
HDLC SERPL-MCNC: 58 MG/DL
HGB BLD-MCNC: 14.4 G/DL (ref 11.1–15.9)
IMM GRANULOCYTES # BLD: 0 X10E3/UL (ref 0–0.1)
IMM GRANULOCYTES NFR BLD: 0 %
INTERPRETATION, 910389: NORMAL
LDLC SERPL CALC-MCNC: 90 MG/DL (ref 0–99)
LDLC/HDLC SERPL: 1.6 RATIO UNITS (ref 0–3.2)
LYMPHOCYTES # BLD AUTO: 1.5 X10E3/UL (ref 0.7–3.1)
LYMPHOCYTES NFR BLD AUTO: 33 %
MCH RBC QN AUTO: 32.7 PG (ref 26.6–33)
MCHC RBC AUTO-ENTMCNC: 35.5 G/DL (ref 31.5–35.7)
MCV RBC AUTO: 92 FL (ref 79–97)
MONOCYTES # BLD AUTO: 0.5 X10E3/UL (ref 0.1–0.9)
MONOCYTES NFR BLD AUTO: 12 %
NEUTROPHILS # BLD AUTO: 2.3 X10E3/UL (ref 1.4–7)
NEUTROPHILS NFR BLD AUTO: 51 %
PLATELET # BLD AUTO: 183 X10E3/UL (ref 150–379)
POTASSIUM SERPL-SCNC: 5 MMOL/L (ref 3.5–5.2)
PROT SERPL-MCNC: 6.7 G/DL (ref 6–8.5)
RBC # BLD AUTO: 4.4 X10E6/UL (ref 3.77–5.28)
SODIUM SERPL-SCNC: 144 MMOL/L (ref 134–144)
TRIGL SERPL-MCNC: 94 MG/DL (ref 0–149)
VLDLC SERPL CALC-MCNC: 19 MG/DL (ref 5–40)
WBC # BLD AUTO: 4.4 X10E3/UL (ref 3.4–10.8)

## 2018-07-10 ENCOUNTER — OFFICE VISIT (OUTPATIENT)
Dept: FAMILY MEDICINE CLINIC | Age: 63
End: 2018-07-10

## 2018-07-10 VITALS
HEART RATE: 72 BPM | DIASTOLIC BLOOD PRESSURE: 83 MMHG | TEMPERATURE: 98.3 F | RESPIRATION RATE: 16 BRPM | BODY MASS INDEX: 20.9 KG/M2 | HEIGHT: 64 IN | SYSTOLIC BLOOD PRESSURE: 112 MMHG | WEIGHT: 122.4 LBS

## 2018-07-10 DIAGNOSIS — Z12.11 COLON CANCER SCREENING: ICD-10-CM

## 2018-07-10 DIAGNOSIS — E55.9 VITAMIN D DEFICIENCY: ICD-10-CM

## 2018-07-10 DIAGNOSIS — I10 ESSENTIAL HYPERTENSION: Primary | ICD-10-CM

## 2018-07-10 DIAGNOSIS — G47.00 INSOMNIA, UNSPECIFIED TYPE: ICD-10-CM

## 2018-07-10 DIAGNOSIS — G47.25 JET LAG: ICD-10-CM

## 2018-07-10 DIAGNOSIS — E78.5 HYPERLIPIDEMIA, UNSPECIFIED HYPERLIPIDEMIA TYPE: ICD-10-CM

## 2018-07-10 RX ORDER — ZOLPIDEM TARTRATE 5 MG/1
TABLET ORAL
Qty: 30 TAB | Refills: 0 | Status: SHIPPED | OUTPATIENT
Start: 2018-07-10 | End: 2019-05-03 | Stop reason: SDUPTHER

## 2018-07-10 RX ORDER — TRAZODONE HYDROCHLORIDE 50 MG/1
TABLET ORAL
Qty: 90 TAB | Refills: 3 | Status: SHIPPED | OUTPATIENT
Start: 2018-07-10 | End: 2019-07-03 | Stop reason: SDUPTHER

## 2018-07-10 NOTE — PROGRESS NOTES
Chief Complaint   Patient presents with    Neck Pain     refill for Trazadone     1. Have you been to the ER, urgent care clinic since your last visit? Hospitalized since your last visit? No    2. Have you seen or consulted any other health care providers outside of the [address] [place] [address] since your last visit? Include any pap smears or colon screening. Yes When: JULIANENathan saw Ganesh Bonilla @[facility],endocrinology. She saw her cardiologist in June,2018.   Health Maintenance Due   Topic Date Due    ZOSTER VACCINE AGE 60>  09/18/2013    COLONOSCOPY  08/24/2016    PAP AKA CERVICAL CYTOLOGY  10/28/2018     Visit Vitals    /83 (BP 1 Location: Right arm, BP Patient Position: Sitting)    Pulse 72    Temp 98.3 °F (36.8 °C) (Oral)    Resp 16    Ht 5' 4\" (1.626 m)    Wt 122 lb 6.4 oz (55.5 kg)    BMI 21.01 kg/m2     Yesica Salvador, GRETCHENN

## 2018-07-10 NOTE — PROGRESS NOTES
HISTORY OF PRESENT ILLNESS  Rita Guerra is a 57 y.o. female. Chief Complaint   Patient presents with    Neck Pain     refill for Trazadone       HPI  Keeps waking up now without Trazodone  Ran out a week ago and can tell a difference  No SE  Takes Ambien for travel and need a refill    Has seen Card in June and got BP and Chol meds  Had nl EKG    Had BW done in May with Endocrin  Will bring in a copy    Osteoporosis  Has seen Endocrin  Not taking Boniva  Will do IV med  On Ca and Vit D occ    Had Colonoscopy in Jan    No tick bites    Review of Systems   Constitutional: Negative for weight loss. HENT: Positive for congestion. Respiratory: Negative for cough, shortness of breath and wheezing. Cardiovascular: Negative for chest pain. Gastrointestinal: Negative for blood in stool. Genitourinary: Negative for hematuria. Musculoskeletal: Positive for joint pain. Neurological: Negative for dizziness and headaches.      Past Medical History:   Diagnosis Date    BCC (basal cell carcinoma of skin)     Cervical stenosis of spinal canal     Chronic UTI     Hypercholesterolemia     Hyperlipidemia    Hypertension     MVP (mitral valve prolapse)     Tricuspid regurgitation    Osteoporosis 2015.01.02    bone density exam       Past Surgical History:   Procedure Laterality Date    CHEST SURGERY PROCEDURE UNLISTED  as infant    pectus excavatum    HX BREAST AUGMENTATION  2003    HX BREAST BIOPSY      HX COLONOSCOPY  08/24/2006 and 1/18    scanned, every 10 y    HX ENDOSCOPY  08/06    HX ORTHOPAEDIC      HX TONSILLECTOMY      NEUROLOGICAL PROCEDURE UNLISTED      cervical disc fusion 1/2006       Current Outpatient Prescriptions   Medication Sig Dispense Refill    traZODone (DESYREL) 50 mg tablet TAKE ONE TABLET BY MOUTH EVERY NIGHT 90 Tab 3    zolpidem (AMBIEN) 5 mg tablet 1/2 - 1 qhs prn 30 Tab 0    simvastatin (ZOCOR) 10 mg tablet TAKE ONE TABLET BY MOUTH EVERY NIGHT 30 Tab 0    fluticasone (FLONASE) 50 mcg/actuation nasal spray 2 spray each nostril daily 3 Bottle 5    metoprolol succinate (TOPROL-XL) 25 mg XL tablet Take 1 Tab by mouth daily. 90 Tab 2    lisinopril (PRINIVIL, ZESTRIL) 5 mg tablet Take 5 mg by mouth.  multivitamin (ONE A DAY) tablet Take 1 tablet by mouth daily. No Known Allergies    Visit Vitals    /83 (BP 1 Location: Right arm, BP Patient Position: Sitting)    Pulse 72    Temp 98.3 °F (36.8 °C) (Oral)    Resp 16    Ht 5' 4\" (1.626 m)    Wt 122 lb 6.4 oz (55.5 kg)    BMI 21.01 kg/m2     Physical Exam   Constitutional: She is oriented to person, place, and time. She appears well-developed and well-nourished. No distress. HENT:   Head: Normocephalic and atraumatic. Right Ear: External ear normal.   Left Ear: External ear normal.   Mouth/Throat: Oropharynx is clear and moist. No oropharyngeal exudate. Eyes: Conjunctivae and EOM are normal.   Cardiovascular: Normal rate, regular rhythm and normal heart sounds. Pulmonary/Chest: Effort normal and breath sounds normal.   Abdominal: Soft. She exhibits no distension. There is no tenderness. Musculoskeletal: She exhibits no edema. Lymphadenopathy:     She has cervical adenopathy. Neurological: She is alert and oriented to person, place, and time. Skin: Skin is warm and dry. Psychiatric: She has a normal mood and affect. Nursing note and vitals reviewed. ASSESSMENT and PLAN    ICD-10-CM ICD-9-CM    1. Essential hypertension I10 401.9 CANCELED: METABOLIC PANEL, COMPREHENSIVE   2. Hyperlipidemia, unspecified hyperlipidemia type E78.5 272.4 CANCELED: LIPID PANEL WITH LDL/HDL RATIO   3. Vitamin D deficiency E55.9 268.9 CANCELED: VITAMIN D, 25 HYDROXY   4. Colon cancer screening Z12.11 V76.51    5. Insomnia, unspecified type G47.00 780.52 traZODone (DESYREL) 50 mg tablet   6.  Jet lag G47.25 327.35 zolpidem (AMBIEN) 5 mg tablet   25 min spent with pt

## 2018-08-06 ENCOUNTER — OFFICE VISIT (OUTPATIENT)
Dept: FAMILY MEDICINE CLINIC | Age: 63
End: 2018-08-06

## 2018-08-06 VITALS
HEIGHT: 64 IN | DIASTOLIC BLOOD PRESSURE: 60 MMHG | BODY MASS INDEX: 21.17 KG/M2 | TEMPERATURE: 98.4 F | SYSTOLIC BLOOD PRESSURE: 108 MMHG | HEART RATE: 62 BPM | RESPIRATION RATE: 16 BRPM | WEIGHT: 124 LBS

## 2018-08-06 DIAGNOSIS — R39.15 URGENCY OF URINATION: ICD-10-CM

## 2018-08-06 DIAGNOSIS — R30.0 DYSURIA: ICD-10-CM

## 2018-08-06 DIAGNOSIS — R35.0 FREQUENCY OF MICTURITION: ICD-10-CM

## 2018-08-06 DIAGNOSIS — N39.0 RECURRENT UTI: Primary | ICD-10-CM

## 2018-08-06 RX ORDER — NITROFURANTOIN 25; 75 MG/1; MG/1
100 CAPSULE ORAL 2 TIMES DAILY
Qty: 14 CAP | Refills: 0 | Status: SHIPPED | OUTPATIENT
Start: 2018-08-06 | End: 2018-08-13

## 2018-08-06 NOTE — PROGRESS NOTES
Benita Harper is a 57 y.o. female who presents to the office today with the following:  Chief Complaint   Patient presents with    Dysuria       HPI  H/o recurrent UTI's. Previously Rx prophylactic Macrobid for 2 years about 10-12 yrs ago. Since then has used it prn. Would like refill. Has not had a UTI in 3 mo. Took AZO and drank cranberry juice bc was mild and went away. No abd pain or back pain. Did feel feverish yesterday, did not check temp, but also with mild ST and some hoarseness (thinks will go away and is not concerned). Woke up yesterday with severe burning and frequency. Today some blood in urine. No other sxs. Did take AZO this morning. Pt is nurse. Review of Systems   Constitutional: Negative for fever. Gastrointestinal: Negative. Negative for abdominal pain. Genitourinary: Positive for dysuria, frequency, hematuria and urgency. Negative for flank pain. Musculoskeletal: Negative for myalgias. Skin: Negative for rash. See HPI. Past Medical History:   Diagnosis Date    BCC (basal cell carcinoma of skin)     Cervical stenosis of spinal canal     Chronic UTI     Hypercholesterolemia     Hyperlipidemia    Hypertension     MVP (mitral valve prolapse)     Tricuspid regurgitation    Osteoporosis 2015.01.02    bone density exam       Past Surgical History:   Procedure Laterality Date    CHEST SURGERY PROCEDURE UNLISTED  as infant    pectus excavatum    HX BREAST AUGMENTATION  2003    HX BREAST BIOPSY      HX COLONOSCOPY  08/24/2006 and 1/18    scanned, every 10 y    HX ENDOSCOPY  08/06    HX ORTHOPAEDIC      HX TONSILLECTOMY      NEUROLOGICAL PROCEDURE UNLISTED      cervical disc fusion 1/2006       No Known Allergies    Current Outpatient Prescriptions   Medication Sig    OTHER Started a generic AZO yesterday.  nitrofurantoin, macrocrystal-monohydrate, (MACROBID) 100 mg capsule Take 1 Cap by mouth two (2) times a day for 7 days.     traZODone (DESYREL) 50 mg tablet TAKE ONE TABLET BY MOUTH EVERY NIGHT    zolpidem (AMBIEN) 5 mg tablet 1/2 - 1 qhs prn    simvastatin (ZOCOR) 10 mg tablet TAKE ONE TABLET BY MOUTH EVERY NIGHT    fluticasone (FLONASE) 50 mcg/actuation nasal spray 2 spray each nostril daily    metoprolol succinate (TOPROL-XL) 25 mg XL tablet Take 1 Tab by mouth daily.  lisinopril (PRINIVIL, ZESTRIL) 5 mg tablet Take 5 mg by mouth.  multivitamin (ONE A DAY) tablet Take 1 tablet by mouth daily. No current facility-administered medications for this visit. Social History     Social History    Marital status:      Spouse name: N/A    Number of children: N/A    Years of education: N/A     Social History Main Topics    Smoking status: Never Smoker    Smokeless tobacco: Never Used    Alcohol use 1.0 oz/week      Comment: social weekender    Drug use: No    Sexual activity: Yes     Partners: Male     Other Topics Concern    None     Social History Narrative    ** Merged History Encounter **            Family History   Problem Relation Age of Onset    Cancer Maternal Grandmother     Cancer Maternal Grandfather     Obesity Mother     Emphysema Father     Diabetes Sister     Diabetes Brother          Physical Exam:  Visit Vitals    /60 (BP 1 Location: Right arm, BP Patient Position: Sitting)    Pulse 62    Temp 98.4 °F (36.9 °C) (Oral)    Resp 16    Ht 5' 4\" (1.626 m)    Wt 124 lb (56.2 kg)    BMI 21.28 kg/m2     Physical Exam   Constitutional: She is oriented to person, place, and time and well-developed, well-nourished, and in no distress. HENT:   Head: Normocephalic and atraumatic. Right Ear: External ear normal.   Left Ear: External ear normal.   Nose: Nose normal.   Mouth/Throat: Oropharynx is clear and moist.   Eyes: Conjunctivae and EOM are normal.   Neck: Normal range of motion. Neck supple. Cardiovascular: Normal rate, regular rhythm and normal heart sounds.     Pulmonary/Chest: Effort normal and breath sounds normal. No respiratory distress. She has no wheezes. She has no rales. Abdominal: Soft. She exhibits no distension and no mass. There is no hepatosplenomegaly. There is no tenderness. There is no rigidity, no rebound, no guarding, no CVA tenderness, no tenderness at McBurney's point and negative Cantrell's sign. Lymphadenopathy:     She has no cervical adenopathy (submand glands feel slightly enlarged L>R). Neurological: She is alert and oriented to person, place, and time. Gait normal.   Skin: Skin is warm and dry. Psychiatric: Mood and affect normal.   Nursing note and vitals reviewed. Assessment/Plan:    ICD-10-CM ICD-9-CM    1. Recurrent UTI N39.0 599.0 CULTURE, URINE   2. Dysuria R30.0 788.1 nitrofurantoin, macrocrystal-monohydrate, (MACROBID) 100 mg capsule      FL HANDLG&/OR CONVEY OF SPEC FOR TR OFFICE TO LAB   3. Urgency of urination R39.15 788.63 nitrofurantoin, macrocrystal-monohydrate, (MACROBID) 100 mg capsule   4. Frequency of micturition R35.0 788.41 nitrofurantoin, macrocrystal-monohydrate, (MACROBID) 100 mg capsule     Instructed to push fluids (ie water/cranberry juice). Avoid caffeine and carbonated bvgs. Counseled on otc medications for sxs relief. Educational handout provided with home care instructions. Instructed to RTO/seek medical attn if sxs persist/worsen. F/u with Urology for recurrence. Also encouraged to return for further evaluation for any ongoing URI sxs, if glands do not go down/any new worsening sxs, otherwise remaining exam today unremarkable. Pt verbalizes understanding and agrees with the plan. Follow-up Disposition:  Return if symptoms worsen or fail to improve.     Harleen Gaines PA-C

## 2018-08-06 NOTE — PATIENT INSTRUCTIONS
Painful Urination (Dysuria): Care Instructions  Your Care Instructions  Burning pain with urination (dysuria) is a common symptom of a urinary tract infection or other urinary problems. The bladder may become inflamed. This can cause pain when the bladder fills and empties. You may also feel pain if the tube that carries urine from the bladder to the outside of the body (urethra) gets irritated or infected. Sexually transmitted infections (STIs) also may cause pain when you urinate. Sometimes the pain can be caused by things other than an infection. The urethra can be irritated by soaps, perfumes, or foreign objects in the urethra. Kidney stones can cause pain when they pass through the urethra. The cause may be hard to find. You may need tests. Treatment for painful urination depends on the cause. Follow-up care is a key part of your treatment and safety. Be sure to make and go to all appointments, and call your doctor if you are having problems. It's also a good idea to know your test results and keep a list of the medicines you take. How can you care for yourself at home? · Drink extra water for the next day or two. This will help make the urine less concentrated. (If you have kidney, heart, or liver disease and have to limit fluids, talk with your doctor before you increase the amount of fluids you drink.)  · Avoid drinks that are carbonated or have caffeine. They can irritate the bladder. · Urinate often. Try to empty your bladder each time. For women:  · Urinate right after you have sex. · After going to the bathroom, wipe from front to back. · Avoid douches, bubble baths, and feminine hygiene sprays. And avoid other feminine hygiene products that have deodorants. When should you call for help? Call your doctor now or seek immediate medical care if:    · You have new symptoms, such as fever, nausea, or vomiting.     · You have new or worse symptoms of a urinary problem.  For example:  Anu Boyd have blood or pus in your urine. ¨ You have chills or body aches. ¨ It hurts worse to urinate. ¨ You have groin or belly pain. ¨ You have pain in your back just below your rib cage (the flank area).    Watch closely for changes in your health, and be sure to contact your doctor if you have any problems. Where can you learn more? Go to http://atul-chika.info/. Enter E270 in the search box to learn more about \"Painful Urination (Dysuria): Care Instructions. \"  Current as of: May 12, 2017  Content Version: 11.7  © 4697-6448 [employer]. Care instructions adapted under license by [employer] (which disclaims liability or warranty for this information). If you have questions about a medical condition or this instruction, always ask your healthcare professional. [place] [address] any warranty or liability for your use of this information. Urinary Tract Infection in Women: Care Instructions  Your Care Instructions    A urinary tract infection, or UTI, is a general term for an infection anywhere between the kidneys and the urethra (where urine comes out). Most UTIs are bladder infections. They often cause pain or burning when you urinate. UTIs are caused by bacteria and can be cured with antibiotics. Be sure to complete your treatment so that the infection goes away. Follow-up care is a key part of your treatment and safety. Be sure to make and go to all appointments, and call your doctor if you are having problems. It's also a good idea to know your test results and keep a list of the medicines you take. How can you care for yourself at home? · Take your antibiotics as directed. Do not stop taking them just because you feel better. You need to take the full course of antibiotics. · Drink extra water and other fluids for the next day or two. This may help wash out the bacteria that are causing the infection.  (If you have kidney, heart, or liver disease and have to limit fluids, talk with your doctor before you increase your fluid intake.)  · Avoid drinks that are carbonated or have caffeine. They can irritate the bladder. · Urinate often. Try to empty your bladder each time. · To relieve pain, take a hot bath or lay a heating pad set on low over your lower belly or genital area. Never go to sleep with a heating pad in place. To prevent UTIs  · Drink plenty of water each day. This helps you urinate often, which clears bacteria from your system. (If you have kidney, heart, or liver disease and have to limit fluids, talk with your doctor before you increase your fluid intake.)  · Urinate when you need to. · Urinate right after you have sex. · Change sanitary pads often. · Avoid douches, bubble baths, feminine hygiene sprays, and other feminine hygiene products that have deodorants. · After going to the bathroom, wipe from front to back. When should you call for help? Call your doctor now or seek immediate medical care if:    · Symptoms such as fever, chills, nausea, or vomiting get worse or appear for the first time.     · You have new pain in your back just below your rib cage. This is called flank pain.     · There is new blood or pus in your urine.     · You have any problems with your antibiotic medicine.    Watch closely for changes in your health, and be sure to contact your doctor if:    · You are not getting better after taking an antibiotic for 2 days.     · Your symptoms go away but then come back. Where can you learn more? Go to http://atul-chika.info/. Enter Y123 in the search box to learn more about \"Urinary Tract Infection in Women: Care Instructions. \"  Current as of: May 12, 2017  Content Version: 11.7  © 6172-4341 [employer]. Care instructions adapted under license by [employer] (which disclaims liability or warranty for this information).  If you have questions about a medical condition or this instruction, always ask your healthcare professional. [place] [address] any warranty or liability for your use of this information.

## 2018-08-06 NOTE — PROGRESS NOTES
Chief Complaint   Patient presents with    Dysuria     1. Have you been to the ER, urgent care clinic since your last visit? Hospitalized since your last visit? No    2. Have you seen or consulted any other health care providers outside of the [address] [place] [address] since your last visit? Include any pap smears or colon screening. Yes When: saw an endocrinologist 1 month ago   Visit Vitals    /60 (BP 1 Location: Right arm, BP Patient Position: Sitting)    Pulse 62    Temp 98.4 °F (36.9 °C) (Oral)    Resp 16    Ht 5' 4\" (1.626 m)    Wt 124 lb (56.2 kg)    BMI 21.28 kg/m2     1. Have you been to the ER, urgent care clinic since your last visit? Hospitalized since your last visit? No    2. Have you seen or consulted any other health care providers outside of the [address] [place] [address] since your last visit? Include any pap smears or colon screening.  No

## 2018-08-09 LAB — BACTERIA UR CULT: ABNORMAL

## 2018-08-13 ENCOUNTER — TELEPHONE (OUTPATIENT)
Dept: FAMILY MEDICINE CLINIC | Age: 63
End: 2018-08-13

## 2018-08-13 NOTE — TELEPHONE ENCOUNTER
Patient states she never received a phone call or voicemail regarding her lab results. Please call her at [phone].

## 2018-08-14 NOTE — TELEPHONE ENCOUNTER
Left pt detailed message re: urine culture results on pt's personalized vm; pt.  Advised in message to call back to office if any further questions

## 2018-11-27 DIAGNOSIS — J30.89 PERENNIAL ALLERGIC RHINITIS: ICD-10-CM

## 2018-11-27 RX ORDER — FLUTICASONE PROPIONATE 50 MCG
SPRAY, SUSPENSION (ML) NASAL
Qty: 48 G | Refills: 0 | Status: SHIPPED | OUTPATIENT
Start: 2018-11-27 | End: 2019-04-05

## 2019-04-02 ENCOUNTER — APPOINTMENT (OUTPATIENT)
Dept: GENERAL RADIOLOGY | Age: 64
End: 2019-04-02
Attending: EMERGENCY MEDICINE
Payer: COMMERCIAL

## 2019-04-02 ENCOUNTER — HOSPITAL ENCOUNTER (EMERGENCY)
Age: 64
Discharge: HOME | End: 2019-04-02
Attending: EMERGENCY MEDICINE
Payer: COMMERCIAL

## 2019-04-02 VITALS
HEART RATE: 93 BPM | RESPIRATION RATE: 16 BRPM | BODY MASS INDEX: 21.15 KG/M2 | DIASTOLIC BLOOD PRESSURE: 99 MMHG | TEMPERATURE: 97.8 F | OXYGEN SATURATION: 100 % | WEIGHT: 123.9 LBS | SYSTOLIC BLOOD PRESSURE: 152 MMHG | HEIGHT: 64 IN

## 2019-04-02 DIAGNOSIS — R07.9 ACUTE CHEST PAIN: ICD-10-CM

## 2019-04-02 DIAGNOSIS — I10 ACCELERATED HYPERTENSION: ICD-10-CM

## 2019-04-02 DIAGNOSIS — R74.8 ELEVATED CK: ICD-10-CM

## 2019-04-02 DIAGNOSIS — R00.2 PALPITATIONS: ICD-10-CM

## 2019-04-02 DIAGNOSIS — R07.89 ATYPICAL CHEST PAIN: Primary | ICD-10-CM

## 2019-04-02 LAB
ALBUMIN SERPL-MCNC: 4.1 G/DL (ref 3.5–5)
ALBUMIN/GLOB SERPL: 1.1 {RATIO} (ref 1.1–2.2)
ALP SERPL-CCNC: 84 U/L (ref 45–117)
ALT SERPL-CCNC: 23 U/L (ref 12–78)
ANION GAP SERPL CALC-SCNC: 9 MMOL/L (ref 5–15)
AST SERPL-CCNC: 28 U/L (ref 15–37)
BASOPHILS # BLD: 0.1 K/UL (ref 0–0.1)
BASOPHILS NFR BLD: 1 % (ref 0–1)
BILIRUB SERPL-MCNC: 0.6 MG/DL (ref 0.2–1)
BNP SERPL-MCNC: 239 PG/ML
BUN SERPL-MCNC: 17 MG/DL (ref 6–20)
BUN/CREAT SERPL: 25 (ref 12–20)
CALCIUM SERPL-MCNC: 9.2 MG/DL (ref 8.5–10.1)
CHLORIDE SERPL-SCNC: 102 MMOL/L (ref 97–108)
CK MB CFR SERPL CALC: 0.4 % (ref 0–2.5)
CK MB CFR SERPL CALC: 0.4 % (ref 0–2.5)
CK MB SERPL-MCNC: 1.7 NG/ML (ref 5–25)
CK MB SERPL-MCNC: 1.8 NG/ML (ref 5–25)
CK SERPL-CCNC: 440 U/L (ref 26–192)
CK SERPL-CCNC: 445 U/L (ref 26–192)
CO2 SERPL-SCNC: 28 MMOL/L (ref 21–32)
CREAT SERPL-MCNC: 0.68 MG/DL (ref 0.55–1.02)
D DIMER PPP FEU-MCNC: 0.22 MG/L FEU (ref 0–0.65)
DIFFERENTIAL METHOD BLD: NORMAL
EOSINOPHIL # BLD: 0.1 K/UL (ref 0–0.4)
EOSINOPHIL NFR BLD: 2 % (ref 0–7)
ERYTHROCYTE [DISTWIDTH] IN BLOOD BY AUTOMATED COUNT: 12 % (ref 11.5–14.5)
GLOBULIN SER CALC-MCNC: 3.8 G/DL (ref 2–4)
GLUCOSE SERPL-MCNC: 98 MG/DL (ref 65–100)
HCT VFR BLD AUTO: 43.8 % (ref 35–47)
HGB BLD-MCNC: 15 G/DL (ref 11.5–16)
IMM GRANULOCYTES # BLD AUTO: 0 K/UL (ref 0–0.04)
IMM GRANULOCYTES NFR BLD AUTO: 0 % (ref 0–0.5)
LYMPHOCYTES # BLD: 2.3 K/UL (ref 0.8–3.5)
LYMPHOCYTES NFR BLD: 39 % (ref 12–49)
MAGNESIUM SERPL-MCNC: 2.4 MG/DL (ref 1.6–2.4)
MCH RBC QN AUTO: 32.8 PG (ref 26–34)
MCHC RBC AUTO-ENTMCNC: 34.2 G/DL (ref 30–36.5)
MCV RBC AUTO: 95.6 FL (ref 80–99)
MONOCYTES # BLD: 0.7 K/UL (ref 0–1)
MONOCYTES NFR BLD: 12 % (ref 5–13)
NEUTS SEG # BLD: 2.7 K/UL (ref 1.8–8)
NEUTS SEG NFR BLD: 46 % (ref 32–75)
NRBC # BLD: 0 K/UL (ref 0–0.01)
NRBC BLD-RTO: 0 PER 100 WBC
PLATELET # BLD AUTO: 190 K/UL (ref 150–400)
PMV BLD AUTO: 9.6 FL (ref 8.9–12.9)
POTASSIUM SERPL-SCNC: 3.8 MMOL/L (ref 3.5–5.1)
PROT SERPL-MCNC: 7.9 G/DL (ref 6.4–8.2)
RBC # BLD AUTO: 4.58 M/UL (ref 3.8–5.2)
SODIUM SERPL-SCNC: 139 MMOL/L (ref 136–145)
TROPONIN I SERPL-MCNC: <0.05 NG/ML
TROPONIN I SERPL-MCNC: <0.05 NG/ML
TSH SERPL DL<=0.05 MIU/L-ACNC: 2 UIU/ML (ref 0.36–3.74)
WBC # BLD AUTO: 6 K/UL (ref 3.6–11)

## 2019-04-02 PROCEDURE — 36415 COLL VENOUS BLD VENIPUNCTURE: CPT

## 2019-04-02 PROCEDURE — 96374 THER/PROPH/DIAG INJ IV PUSH: CPT

## 2019-04-02 PROCEDURE — 93005 ELECTROCARDIOGRAM TRACING: CPT

## 2019-04-02 PROCEDURE — 74011250637 HC RX REV CODE- 250/637: Performed by: EMERGENCY MEDICINE

## 2019-04-02 PROCEDURE — 71046 X-RAY EXAM CHEST 2 VIEWS: CPT

## 2019-04-02 PROCEDURE — 80053 COMPREHEN METABOLIC PANEL: CPT

## 2019-04-02 PROCEDURE — 74011250636 HC RX REV CODE- 250/636: Performed by: EMERGENCY MEDICINE

## 2019-04-02 PROCEDURE — 84443 ASSAY THYROID STIM HORMONE: CPT

## 2019-04-02 PROCEDURE — 83880 ASSAY OF NATRIURETIC PEPTIDE: CPT

## 2019-04-02 PROCEDURE — 99283 EMERGENCY DEPT VISIT LOW MDM: CPT

## 2019-04-02 PROCEDURE — 85379 FIBRIN DEGRADATION QUANT: CPT

## 2019-04-02 PROCEDURE — 82550 ASSAY OF CK (CPK): CPT

## 2019-04-02 PROCEDURE — 83735 ASSAY OF MAGNESIUM: CPT

## 2019-04-02 PROCEDURE — 85025 COMPLETE CBC W/AUTO DIFF WBC: CPT

## 2019-04-02 PROCEDURE — 84484 ASSAY OF TROPONIN QUANT: CPT

## 2019-04-02 RX ORDER — KETOROLAC TROMETHAMINE 30 MG/ML
15 INJECTION, SOLUTION INTRAMUSCULAR; INTRAVENOUS
Status: COMPLETED | OUTPATIENT
Start: 2019-04-02 | End: 2019-04-02

## 2019-04-02 RX ORDER — ASPIRIN 325 MG
325 TABLET ORAL
Status: COMPLETED | OUTPATIENT
Start: 2019-04-02 | End: 2019-04-02

## 2019-04-02 RX ORDER — DIAZEPAM 5 MG/1
5 TABLET ORAL
Qty: 15 TAB | Refills: 0 | Status: SHIPPED | OUTPATIENT
Start: 2019-04-02 | End: 2019-04-05

## 2019-04-02 RX ORDER — NAPROXEN 250 MG/1
250 TABLET ORAL 2 TIMES DAILY WITH MEALS
Qty: 20 TAB | Refills: 0 | Status: SHIPPED | OUTPATIENT
Start: 2019-04-02 | End: 2019-07-08

## 2019-04-02 RX ORDER — DIAZEPAM 5 MG/1
5 TABLET ORAL
Status: COMPLETED | OUTPATIENT
Start: 2019-04-02 | End: 2019-04-02

## 2019-04-02 RX ADMIN — DIAZEPAM 5 MG: 5 TABLET ORAL at 21:00

## 2019-04-02 RX ADMIN — ASPIRIN 325 MG: 325 TABLET ORAL at 18:58

## 2019-04-02 RX ADMIN — KETOROLAC TROMETHAMINE 15 MG: 30 INJECTION, SOLUTION INTRAMUSCULAR at 21:01

## 2019-04-02 NOTE — ED PROVIDER NOTES
EMERGENCY DEPARTMENT HISTORY AND PHYSICAL EXAM 
 
 
Date: 4/2/2019 Patient Name: Juanita Huff Patient Age and Sex: 70 y.o. female History of Presenting Illness Chief Complaint Patient presents with  Chest Pain Pt reports onset of tightness to left upper chest at 1300 today while [place] to airport. History Provided By: Patient HPI: Juanita Huff, 70 y.o. female with PMHx significant for mitral valve prolapse, chronic UTI, basal cell carcinoma of the skin presents ambulatory to the emergency with acute onset of left-sided chest tightness around 1 PM today while at the airport. Patient states she has history of paroxysmal SVT and is currently on metoprolol 25 mg XL. Noticed onset of chest discomfort around 1 PM today described as 7 out of 10 in intensity. Patient went to the drugstore and picked a prior aspirin but denies taking any medication prior to coming in. Patient reports following Dr. Amelia thorpe in the past and had a stress test about 12 years ago which was unremarkable. Patient also has a history of mitral valve prolapse but has not noticed any presyncope, syncope or lower extremity edema. Patient does travel often for work but denies any history of DVT or PE. She denies any lower extremity cramping or swelling. The patient denies any history of CAD or MI. There are no other modifying factors to the H&P at this time. Of note patient is currently wearing an event monitor and has an appointment with a cardiologist in about one week. Additionally, pt specifically denies any recent fever, chills, headache, nausea, vomiting, diarrhea, abdominal pain, numbness, weakness, tingling, BLE swelling, urinary sxs, changes in BM, changes in PO intake, melena, hematochezia, cough, or congestion. PCP: Shun Ramos MD 
 
There are no other complaints, changes or physical findings at this time. Past History Past Medical History: Past Medical History:  
Diagnosis Date  BCC (basal cell carcinoma of skin)  Cervical stenosis of spinal canal   
 Chronic UTI  Hypercholesterolemia Hyperlipidemia  Hypertension  MVP (mitral valve prolapse) Tricuspid regurgitation  Osteoporosis 2015.01.02  
 bone density exam  
 PSVT (paroxysmal supraventricular tachycardia) ([facility], [place] [address] Past Surgical History: 
Past Surgical History:  
Procedure Laterality Date  CHEST SURGERY PROCEDURE UNLISTED  as infant  
 pectus excavatum  HX BREAST AUGMENTATION  2003  HX BREAST BIOPSY  HX COLONOSCOPY  08/24/2006 and 1/18  
 scanned, every 10 y  
 HX ENDOSCOPY  08/06  HX ORTHOPAEDIC    
 HX TONSILLECTOMY  NEUROLOGICAL PROCEDURE UNLISTED    
 cervical disc fusion 1/2006 Family History: 
Family History Problem Relation Age of Onset  Cancer Maternal Grandmother  Cancer Maternal Grandfather  Obesity Mother  Emphysema Father  Diabetes Sister  Diabetes Brother Social History: 
Social History Tobacco Use  Smoking status: Never Smoker  Smokeless tobacco: Never Used Substance Use Topics  Alcohol use: Yes Alcohol/week: 1.0 oz  
  Comment: social weekender  Drug use: No  
 
 
Allergies: 
No Known Allergies Medications: No current facility-administered medications on file prior to encounter. Current Outpatient Medications on File Prior to Encounter Medication Sig Dispense Refill  fluticasone (FLONASE) 50 mcg/actuation nasal spray 2 SPRAY EACH NOSTRIL DAILY 48 g 0  
 OTHER Started a generic AZO yesterday.  traZODone (DESYREL) 50 mg tablet TAKE ONE TABLET BY MOUTH EVERY NIGHT 90 Tab 3  
 zolpidem (AMBIEN) 5 mg tablet 1/2 - 1 qhs prn 30 Tab 0  
 simvastatin (ZOCOR) 10 mg tablet TAKE ONE TABLET BY MOUTH EVERY NIGHT 30 Tab 0  
 metoprolol succinate (TOPROL-XL) 25 mg XL tablet Take 1 Tab by mouth daily.  90 Tab 2  
  lisinopril (PRINIVIL, ZESTRIL) 5 mg tablet Take 5 mg by mouth.  multivitamin (ONE A DAY) tablet Take 1 tablet by mouth daily. Review of Systems Review of Systems Constitutional: Negative. Negative for chills and fever. HENT: Negative. Negative for congestion, facial swelling, rhinorrhea, sore throat, trouble swallowing and voice change. Eyes: Negative. Respiratory: Positive for chest tightness. Negative for apnea, cough, shortness of breath and wheezing. Cardiovascular: Positive for palpitations. Negative for chest pain and leg swelling. Gastrointestinal: Negative. Negative for abdominal distention, abdominal pain, blood in stool, constipation, diarrhea, nausea and vomiting. Endocrine: Negative. Negative for cold intolerance, heat intolerance and polyuria. Genitourinary: Negative. Negative for difficulty urinating, dysuria, flank pain, frequency, hematuria and urgency. Musculoskeletal: Negative. Negative for arthralgias, back pain, myalgias, neck pain and neck stiffness. Skin: Negative. Negative for color change and rash. Neurological: Negative. Negative for dizziness, syncope, facial asymmetry, speech difficulty, weakness, light-headedness, numbness and headaches. Hematological: Negative. Does not bruise/bleed easily. Psychiatric/Behavioral: Negative. Negative for confusion and self-injury. The patient is not nervous/anxious. Physical Exam  
Physical Exam  
Constitutional: She is oriented to person, place, and time. She appears well-developed and well-nourished. No distress. HENT:  
Head: Normocephalic and atraumatic. Mouth/Throat: Oropharynx is clear and moist. No oropharyngeal exudate. Eyes: Pupils are equal, round, and reactive to light. Conjunctivae and EOM are normal.  
Neck: Normal range of motion. Cardiovascular: Normal rate, regular rhythm and normal heart sounds. Exam reveals no gallop and no friction rub. No murmur heard. Pulmonary/Chest: Effort normal and breath sounds normal. No respiratory distress. She has no wheezes. She has no rales. She exhibits no tenderness. Abdominal: Soft. Bowel sounds are normal. She exhibits no distension and no mass. There is no tenderness. There is no rebound and no guarding. Musculoskeletal: Normal range of motion. She exhibits no edema, tenderness or deformity. Neurological: She is alert and oriented to person, place, and time. She displays normal reflexes. No cranial nerve deficit. She exhibits normal muscle tone. Coordination normal.  
Skin: Skin is warm. No rash noted. She is not diaphoretic. Psychiatric: She has a normal mood and affect. Nursing note and vitals reviewed. Diagnostic Study Results Labs - Recent Results (from the past 24 hour(s)) EKG, 12 LEAD, INITIAL Collection Time: 04/02/19  4:50 PM  
Result Value Ref Range Ventricular Rate 56 BPM  
 Atrial Rate 56 BPM  
 P-R Interval 200 ms QRS Duration 86 ms  
 Q-T Interval 460 ms QTC Calculation (Bezet) 443 ms Calculated P Axis 67 degrees Calculated R Axis 83 degrees Calculated T Axis 71 degrees Diagnosis Sinus bradycardia Septal infarct , age undetermined No previous ECGs available METABOLIC PANEL, COMPREHENSIVE Collection Time: 04/02/19  5:38 PM  
Result Value Ref Range Sodium 139 136 - 145 mmol/L Potassium 3.8 3.5 - 5.1 mmol/L Chloride 102 97 - 108 mmol/L  
 CO2 28 21 - 32 mmol/L Anion gap 9 5 - 15 mmol/L Glucose 98 65 - 100 mg/dL BUN 17 6 - 20 MG/DL Creatinine 0.68 0.55 - 1.02 MG/DL  
 BUN/Creatinine ratio 25 (H) 12 - 20 GFR est AA >60 >60 ml/min/1.73m2 GFR est non-AA >60 >60 ml/min/1.73m2 Calcium 9.2 8.5 - 10.1 MG/DL Bilirubin, total 0.6 0.2 - 1.0 MG/DL  
 ALT (SGPT) 23 12 - 78 U/L  
 AST (SGOT) 28 15 - 37 U/L Alk. phosphatase 84 45 - 117 U/L Protein, total 7.9 6.4 - 8.2 g/dL Albumin 4.1 3.5 - 5.0 g/dL Globulin 3.8 2.0 - 4.0 g/dL A-G Ratio 1.1 1.1 - 2.2    
CBC WITH AUTOMATED DIFF Collection Time: 04/02/19  5:38 PM  
Result Value Ref Range WBC 6.0 3.6 - 11.0 K/uL  
 RBC 4.58 3.80 - 5.20 M/uL  
 HGB 15.0 11.5 - 16.0 g/dL HCT 43.8 35.0 - 47.0 % MCV 95.6 80.0 - 99.0 FL  
 MCH 32.8 26.0 - 34.0 PG  
 MCHC 34.2 30.0 - 36.5 g/dL  
 RDW 12.0 11.5 - 14.5 % PLATELET 545 129 - 739 K/uL MPV 9.6 8.9 - 12.9 FL  
 NRBC 0.0 0  WBC ABSOLUTE NRBC 0.00 0.00 - 0.01 K/uL NEUTROPHILS 46 32 - 75 % LYMPHOCYTES 39 12 - 49 % MONOCYTES 12 5 - 13 % EOSINOPHILS 2 0 - 7 % BASOPHILS 1 0 - 1 % IMMATURE GRANULOCYTES 0 0.0 - 0.5 % ABS. NEUTROPHILS 2.7 1.8 - 8.0 K/UL  
 ABS. LYMPHOCYTES 2.3 0.8 - 3.5 K/UL  
 ABS. MONOCYTES 0.7 0.0 - 1.0 K/UL  
 ABS. EOSINOPHILS 0.1 0.0 - 0.4 K/UL  
 ABS. BASOPHILS 0.1 0.0 - 0.1 K/UL  
 ABS. IMM. GRANS. 0.0 0.00 - 0.04 K/UL  
 DF AUTOMATED    
TROPONIN I Collection Time: 04/02/19  5:38 PM  
Result Value Ref Range Troponin-I, Qt. <0.05 <0.05 ng/mL NT-PRO BNP Collection Time: 04/02/19  5:38 PM  
Result Value Ref Range NT pro- (H) <125 PG/ML  
CK W/ CKMB & INDEX Collection Time: 04/02/19  5:38 PM  
Result Value Ref Range  (H) 26 - 192 U/L  
 CK - MB 1.8 <3.6 NG/ML  
 CK-MB Index 0.4 0.0 - 2.5 MAGNESIUM Collection Time: 04/02/19  5:38 PM  
Result Value Ref Range Magnesium 2.4 1.6 - 2.4 mg/dL TSH 3RD GENERATION Collection Time: 04/02/19  5:38 PM  
Result Value Ref Range TSH 2.00 0.36 - 3.74 uIU/mL  
D DIMER Collection Time: 04/02/19  6:51 PM  
Result Value Ref Range D-dimer 0.22 0.00 - 0.65 mg/L FEU  
TROPONIN I Collection Time: 04/02/19  7:50 PM  
Result Value Ref Range Troponin-I, Qt. <0.05 <0.05 ng/mL CK W/ REFLX CKMB Collection Time: 04/02/19  7:50 PM  
Result Value Ref Range  (H) 26 - 192 U/L  
CK-MB,QUANT. Collection Time: 04/02/19  7:50 PM  
Result Value Ref Range  CK - MB 1.7 <3.6 NG/ML  
 CK-MB Index 0.4 0.0 - 2.5 Radiologic Studies -  
XR CHEST PA LAT Final Result IMPRESSION: No acute intrathoracic disease. CT Results  (Last 48 hours) None CXR Results  (Last 48 hours) 04/02/19 1736  XR CHEST PA LAT Final result Impression:  IMPRESSION: No acute intrathoracic disease. Narrative:  CLINICAL HISTORY: Chest pain INDICATION: Chest pain, hyperlipidemia COMPARISON: 2/24/2018 FINDINGS:   
PA and lateral views of the chest are obtained. The cardiopericardial silhouette is within normal limits. There is no pleural  
effusion, pneumothorax or focal consolidation present. Scoliosis. Pectus  
deformity as well. Medical Decision Making I am the first provider for this patient. I reviewed the vital signs, available nursing notes, past medical history, past surgical history, family history and social history. Vital Signs-Reviewed the patient's vital signs. Patient Vitals for the past 24 hrs: 
 Temp Pulse Resp BP SpO2  
04/02/19 1659 97.8 °F (36.6 °C) 93 16 (!) 152/99 100 % Pulse Oximetry Analysis - 100% on RA Cardiac Monitor:  
Rate: 93 bpm 
Rhythm: Normal Sinus Rhythm ED EKG interpretation: 
Rhythm: sinus bradycardia; and regular . Rate (approx.): 56; Axis: normal; P wave: normal; QRS interval: normal ; ST/T wave: normal; Other findings: normal. This EKG was interpreted by Otis Colorado M.D. Records Reviewed: Nursing Notes, Old Medical Records, Previous electrocardiograms, Previous Radiology Studies and Previous Laboratory Studies Provider Notes (Medical Decision Making): DDx: atypical chest pain, ACS, costochondritis, PNA, bronchitis, CHF, pleural effusion, MSK pain, GERD, pancreatitis Pt presents with acute chest pain; stable vitals and nontoxic appearing. Will pursue cardiac work-up with EKG, troponin, ckmb, CXR.  While the pt is less likely to have pneumonia as there is no cough and no fever, and less likely to have ptx, will order CXR to assess lung fields. Will provide pain control and reassess. ED Course:  
Initial assessment performed. The patients presenting problems have been discussed, and they are in agreement with the care plan formulated and outlined with them. I have encouraged them to ask questions as they arise throughout their visit. ALCOHOL/SUBSTANCE ABUSE COUNSELING: 
Upon evaluation, pt endorsed recent alcohol/illicit drug use. For approximately 15 minutes, pt has been counseled on the dangers of alcohol and illicit drug use on their health, and they were encouraged to quit as soon as possible in order to decrease further risks to their health. Pt has conveyed their understanding of the risks involved should they continue to use these products. HYPERTENSION COUNSELING Education was provided to the patient today regarding their hypertension. Patient is made aware of their elevated blood pressure and is instructed to follow up this week with their Primary Care for a recheck. Patient is counseled regarding consequences of chronic, uncontrolled hypertension including kidney disease, heart disease, stroke or even death. Patient states their understanding and agrees to follow up this week. Additionally, during their visit, I discussed sodium restriction, maintaining ideal body weight and regular exercise program as physiologic means to achieve blood pressure control. The patient will strive towards this. I reviewed our electronic medical record system for any past medical records that were available that may contribute to the patient's current condition, the nursing notes and vital signs from today's visit. Kush Velasquez MD 
Medications Administered During ED Course: 
Medications  
aspirin tablet 325 mg (325 mg Oral Given 4/2/19 1858) diazePAM (VALIUM) tablet 5 mg (5 mg Oral Given 4/2/19 2100) ketorolac (TORADOL) injection 15 mg (15 mg IntraVENous Given 4/2/19 2101) Progress Note: 
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 She will f/u with her Cardiologist, Dr. Gustavo Mcelroy next week. Patient has been reassessed and reports feeling better and symptoms have improved after ED treatment. Clarissa Gonzales is able to tolerate PO and ambulate per baseline. Nickolas Domínguez's final labs and imaging have been reviewed with her. She has been counseled regarding her diagnosis. She verbally conveys understanding and agreement of the signs, symptoms, diagnosis, treatment and prognosis and additionally agrees to follow up as recommended with Dr. Lc Weir MD in 24 - 48 hours. She also agrees with the care-plan and conveys that all of her questions have been answered. I have also put together some discharge instructions for her that include: 1) educational information regarding their diagnosis, 2) how to care for their diagnosis at home, as well a 3) list of reasons why they would want to return to the ED prior to their follow-up appointment, should their condition change. I have answered all questions to the patient's satisfaction. Strict return precautions given. She both understood and agreed with plan as discussed above. Vital signs stable for discharge. Disposition: DISCHARGE The pt is ready for discharge. The pt's signs, symptoms, diagnosis, and discharge instructions have been discussed and pt has conveyed their understanding. The pt is to follow up as recommended or return to ER should their symptoms worsen. Plan has been discussed and pt is in agreement. PLAN: 
1. Discharge Medication List as of 4/2/2019  8:57 PM  
  
CONTINUE these medications which have NOT CHANGED  Details  
fluticasone (FLONASE) 50 mcg/actuation nasal spray 2 SPRAY EACH NOSTRIL DAILY, Normal, Disp-48 g, R-0  
  
 OTHER Started a generic AZO yesterday., Historical Med  
  
traZODone (DESYREL) 50 mg tablet TAKE ONE TABLET BY MOUTH EVERY NIGHT, Normal, Disp-90 Tab, R-3  
  
zolpidem (AMBIEN) 5 mg tablet 1/2 - 1 qhs prn, Print, Disp-30 Tab, R-0  
  
simvastatin (ZOCOR) 10 mg tablet TAKE ONE TABLET BY MOUTH EVERY NIGHT, Normal, Disp-30 Tab, R-0  
  
metoprolol succinate (TOPROL-XL) 25 mg XL tablet Take 1 Tab by mouth daily. , Normal, Disp-90 Tab, R-2  
  
lisinopril (PRINIVIL, ZESTRIL) 5 mg tablet Take 5 mg by mouth., Historical Med  
  
multivitamin (ONE A DAY) tablet Take 1 tablet by mouth daily. , Historical Med 2. Follow-up Information Follow up With Specialties Details Why Contact Info Ita Silvestre MD [facility]   [address] [address] 
[phone] [facility] EMERGENCY DEPT Emergency Medicine  As needed, If symptoms worsen [address] [facility] [address] [facility] 
[phone] Kennedi Romeo MD Cardiology Schedule an appointment as soon as possible for a visit in 1 day  [id number] [facility] 
[phone] Return to ED if worse Diagnosis Clinical Impression: 1. Atypical chest pain 2. Palpitations 3. Acute chest pain 4. Accelerated hypertension 5. Elevated CK Attestation: 
 
I personally performed the services described in this documentation on this date 4/2/2019 for patient Jyoti Elena. I have reviewed and verified that the information is accurate and complete. Adis Pedersen MD 
 
Please note that this dictation was completed with [employer], the [employer] voice recognition software. Quite often unanticipated grammatical, syntax, homophones, and other interpretive errors are inadvertently transcribed by the computer software. Please disregard these errors. Please excuse any errors that have escaped final proofreading. Thank you. This note will not be viewable in [address]

## 2019-04-03 LAB
ATRIAL RATE: 56 BPM
CALCULATED P AXIS, ECG09: 67 DEGREES
CALCULATED R AXIS, ECG10: 83 DEGREES
CALCULATED T AXIS, ECG11: 71 DEGREES
DIAGNOSIS, 93000: NORMAL
P-R INTERVAL, ECG05: 200 MS
Q-T INTERVAL, ECG07: 460 MS
QRS DURATION, ECG06: 86 MS
QTC CALCULATION (BEZET), ECG08: 443 MS
VENTRICULAR RATE, ECG03: 56 BPM

## 2019-04-03 NOTE — DISCHARGE INSTRUCTIONS
Thank you for allowing us to take care of you today! We hope we addressed all of your concerns and needs. We strive to provide excellent quality care in the Emergency Department. You will receive a survey after your visit to evaluate the care you were provided. Should you receive a survey from us, we invite you to share your experience and tell us what made it excellent. It was a pleasure serving you, we invite you to share your experience with us, in our pursuit for excellence, should you be selected to receive a survey. The exam and treatment you received in the Emergency Department were for an urgent problem and are not intended as complete care. It is important that you follow up with a doctor, nurse practitioner, or physician assistant for ongoing care. If your symptoms become worse or you do not improve as expected and you are unable to reach your usual health care provider, you should return to the Emergency Department. We are available 24 hours a day. Please take your discharge instructions with you when you go to your follow-up appointment. If you have any problem arranging a follow-up appointment, contact the Emergency Department immediately. If a prescription has been provided, please have it filled as soon as possible to prevent a delay in treatment. Read the entire medication instruction sheet provided to you by the pharmacy. If you have any questions or reservations about taking the medication due to side effects or interactions with other medications, please call your primary care physician or contact the ER to speak with the charge nurse. Make an appointment with your family doctor or the physician you were referred to for follow-up of this visit as instructed on your discharge paperwork, as this is mandatory follow-up. Return to the ER if you are unable to be seen or if you are unable to be seen in a timely manner.     If you have any problem arranging the follow-up visit, contact the Emergency Department immediately. I hope you feel better and thank you again for allow us to provide you with excellent care today at . [facility]! Warmest regards,    Shy Bello MD  Emergency Medicine Physician  [address]              Patient Education        Musculoskeletal Chest Pain: Care Instructions  Your Care Instructions    Chest pain is not always a sign that something is wrong with your heart or that you have another serious problem. The doctor thinks your chest pain is caused by strained muscles or ligaments, inflamed chest cartilage, or another problem in your chest, rather than by your heart. You may need more tests to find the cause of your chest pain. Follow-up care is a key part of your treatment and safety. Be sure to make and go to all appointments, and call your doctor if you are having problems. It's also a good idea to know your test results and keep a list of the medicines you take. How can you care for yourself at home? · Take pain medicines exactly as directed. ? If the doctor gave you a prescription medicine for pain, take it as prescribed. ? If you are not taking a prescription pain medicine, ask your doctor if you can take an over-the-counter medicine. · Rest and protect the sore area. · Stop, change, or take a break from any activity that may be causing your pain or soreness. · Put ice or a cold pack on the sore area for 10 to 20 minutes at a time. Try to do this every 1 to 2 hours for the next 3 days (when you are awake) or until the swelling goes down. Put a thin cloth between the ice and your skin. · After 2 or 3 days, apply a heating pad set on low or a warm cloth to the area that hurts. Some doctors suggest that you go back and forth between hot and cold. · Do not wrap or tape your ribs for support.  This may cause you to take smaller breaths, which could increase your risk of lung problems. · Mentholated creams such as Bengay or Icy Hot may soothe sore muscles. Follow the instructions on the package. · Follow your doctor's instructions for exercising. · Gentle stretching and massage may help you get better faster. Stretch slowly to the point just before pain begins, and hold the stretch for at least 15 to 30 seconds. Do this 3 or 4 times a day. Stretch just after you have applied heat. · As your pain gets better, slowly return to your normal activities. Any increased pain may be a sign that you need to rest a while longer. When should you call for help? Call 911 anytime you think you may need emergency care. For example, call if:    · You have chest pain or pressure. This may occur with:  ? Sweating. ? Shortness of breath. ? Nausea or vomiting. ? Pain that spreads from the chest to the neck, jaw, or one or both shoulders or arms. ? Dizziness or lightheadedness. ? A fast or uneven pulse. After calling 911, chew 1 adult-strength aspirin. Wait for an ambulance. Do not try to drive yourself.     · You have sudden chest pain and shortness of breath, or you cough up blood.    Call your doctor now or seek immediate medical care if:    · You have any trouble breathing.     · Your chest pain gets worse.     · Your chest pain occurs consistently with exercise and is relieved by rest.    Watch closely for changes in your health, and be sure to contact your doctor if:    · Your chest pain does not get better after 1 week. Where can you learn more? Go to http://atul-chika.info/. Enter V293 in the search box to learn more about \"Musculoskeletal Chest Pain: Care Instructions. \"  Current as of: September 23, 2018  Content Version: 11.9  © 9093-0639 [employer]. Care instructions adapted under license by [employer] (which disclaims liability or warranty for this information).  If you have questions about a medical condition or this instruction, always ask your healthcare professional. [place] [address] any warranty or liability for your use of this information. Patient Education        Chest Pain: Care Instructions  Your Care Instructions    There are many things that can cause chest pain. Some are not serious and will get better on their own in a few days. But some kinds of chest pain need more testing and treatment. Your doctor may have recommended a follow-up visit in the next 8 to 12 hours. If you are not getting better, you may need more tests or treatment. Even though your doctor has released you, you still need to watch for any problems. The doctor carefully checked you, but sometimes problems can develop later. If you have new symptoms or if your symptoms do not get better, get medical care right away. If you have worse or different chest pain or pressure that lasts more than 5 minutes or you passed out (lost consciousness), call 911 or seek other emergency help right away. A medical visit is only one step in your treatment. Even if you feel better, you still need to do what your doctor recommends, such as going to all suggested follow-up appointments and taking medicines exactly as directed. This will help you recover and help prevent future problems. How can you care for yourself at home? · Rest until you feel better. · Take your medicine exactly as prescribed. Call your doctor if you think you are having a problem with your medicine. · Do not drive after taking a prescription pain medicine. When should you call for help? Call 911 if:    · You passed out (lost consciousness).     · You have severe difficulty breathing.     · You have symptoms of a heart attack. These may include:  ? Chest pain or pressure, or a strange feeling in your chest.  ? Sweating. ? Shortness of breath. ? Nausea or vomiting.   ? Pain, pressure, or a strange feeling in your back, neck, jaw, or upper belly or in one or both shoulders or arms.  ? Lightheadedness or sudden weakness. ? A fast or irregular heartbeat. After you call 911, the  may tell you to chew 1 adult-strength or 2 to 4 low-dose aspirin. Wait for an ambulance. Do not try to drive yourself.    Call your doctor today if:    · You have any trouble breathing.     · Your chest pain gets worse.     · You are dizzy or lightheaded, or you feel like you may faint.     · You are not getting better as expected.     · You are having new or different chest pain. Where can you learn more? Go to http://atul-chika.info/. Enter A120 in the search box to learn more about \"Chest Pain: Care Instructions. \"  Current as of: September 23, 2018  Content Version: 11.9  © 7704-8986 [employer]. Care instructions adapted under license by [employer] (which disclaims liability or warranty for this information). If you have questions about a medical condition or this instruction, always ask your healthcare professional. [address] any warranty or liability for your use of this information.

## 2019-06-27 LAB — MAMMOGRAPHY, EXTERNAL: NORMAL

## 2020-02-20 ENCOUNTER — TELEPHONE (OUTPATIENT)
Dept: FAMILY MEDICINE CLINIC | Age: 65
End: 2020-02-20

## 2020-02-20 DIAGNOSIS — R68.89 FLU-LIKE SYMPTOMS: Primary | ICD-10-CM

## 2020-02-20 RX ORDER — OSELTAMIVIR PHOSPHATE 75 MG/1
75 CAPSULE ORAL 2 TIMES DAILY
Qty: 10 CAP | Refills: 0 | Status: SHIPPED | OUTPATIENT
Start: 2020-02-20 | End: 2020-02-25

## 2020-02-20 NOTE — TELEPHONE ENCOUNTER
Patient seen yesterday for sore throat, cough, ears full and taking NyQuil for relief. Was given ABX to take if she developed a fever. Today, she has fatigue, fever and chills. She thinks that she has the Flu and wonders if she should get Tamiflu called in.

## 2020-02-20 NOTE — TELEPHONE ENCOUNTER
Spoke to pt. States now she has a temp of 102, body aches, cough. Feels just like flu. Does not feel she needs the abx, would like tamiflu called in. D/w pt the treatment window, may only help at all if this is new (not what she has had for the last4-5 days and came in for yesterday). She verbalizes understanding and would like this called in. Said she had last year with no SEs. Lucina Aldrich also counseled on secondary bacterial sxs and when to take abx, agrees to seek care if no better.

## 2020-02-20 NOTE — TELEPHONE ENCOUNTER
Patient seen yesterday for sore throat, cough, ears full and taking NyQuil for relief. She was given an ABX to take if she developed a fever. Today, she has fatigue, fever and chills. She thinks she has the Flu and wonders if she should get Tamiflu called in .

## 2020-02-20 NOTE — TELEPHONE ENCOUNTER
Patient returns my call, I have given her the message from [place], [place]. Patient wants the Tamiflu even if it has been over the 3 day threshold. I have sent the call to ROSALINO Gamble to speak to the patient.

## 2020-05-17 NOTE — PROGRESS NOTES
Call pt, the CBC is normal  The sugar is a touch up, avoid conc sweets  The kidney and liver tests are normal  The Chol is good  Recheck in 6 mo no

## 2021-04-13 DIAGNOSIS — G47.00 INSOMNIA, UNSPECIFIED TYPE: ICD-10-CM

## 2021-04-13 DIAGNOSIS — E78.5 HYPERLIPIDEMIA, UNSPECIFIED HYPERLIPIDEMIA TYPE: ICD-10-CM

## 2021-04-13 RX ORDER — TRAZODONE HYDROCHLORIDE 50 MG/1
50 TABLET ORAL
Qty: 30 TAB | Refills: 0 | Status: SHIPPED | OUTPATIENT
Start: 2021-04-13 | End: 2021-05-17 | Stop reason: SDUPTHER

## 2021-04-13 RX ORDER — SIMVASTATIN 5 MG/1
5 TABLET, FILM COATED ORAL
Qty: 30 TAB | Refills: 0 | Status: SHIPPED | OUTPATIENT
Start: 2021-04-13 | End: 2021-05-17 | Stop reason: SDUPTHER

## 2021-04-13 NOTE — TELEPHONE ENCOUNTER
Pt states her schedule will not allow her to make an appointment until may 3rd. whe will run out of medication prior to this. Requested Prescriptions     Pending Prescriptions Disp Refills    traZODone (DESYREL) 50 mg tablet 90 Tab 0    simvastatin (ZOCOR) 5 mg tablet 90 Tab 0     Please call pt back to let her know when refill has been done.  [phone]

## 2021-05-17 ENCOUNTER — OFFICE VISIT (OUTPATIENT)
Dept: FAMILY MEDICINE CLINIC | Age: 66
End: 2021-05-17
Payer: COMMERCIAL

## 2021-05-17 VITALS
HEART RATE: 64 BPM | TEMPERATURE: 97.5 F | OXYGEN SATURATION: 99 % | SYSTOLIC BLOOD PRESSURE: 116 MMHG | HEIGHT: 64 IN | DIASTOLIC BLOOD PRESSURE: 74 MMHG | WEIGHT: 126 LBS | RESPIRATION RATE: 12 BRPM | BODY MASS INDEX: 21.51 KG/M2

## 2021-05-17 DIAGNOSIS — G47.00 INSOMNIA, UNSPECIFIED TYPE: ICD-10-CM

## 2021-05-17 DIAGNOSIS — G47.25 JET LAG: ICD-10-CM

## 2021-05-17 DIAGNOSIS — E78.5 HYPERLIPIDEMIA, UNSPECIFIED HYPERLIPIDEMIA TYPE: Primary | ICD-10-CM

## 2021-05-17 DIAGNOSIS — I10 ESSENTIAL HYPERTENSION: ICD-10-CM

## 2021-05-17 DIAGNOSIS — J02.9 SORE THROAT: ICD-10-CM

## 2021-05-17 DIAGNOSIS — Z23 ENCOUNTER FOR IMMUNIZATION: ICD-10-CM

## 2021-05-17 LAB
S PYO AG THROAT QL: NEGATIVE
VALID INTERNAL CONTROL?: YES

## 2021-05-17 PROCEDURE — 90732 PPSV23 VACC 2 YRS+ SUBQ/IM: CPT | Performed by: FAMILY MEDICINE

## 2021-05-17 PROCEDURE — 90471 IMMUNIZATION ADMIN: CPT | Performed by: FAMILY MEDICINE

## 2021-05-17 PROCEDURE — 36415 COLL VENOUS BLD VENIPUNCTURE: CPT | Performed by: FAMILY MEDICINE

## 2021-05-17 PROCEDURE — 87880 STREP A ASSAY W/OPTIC: CPT | Performed by: FAMILY MEDICINE

## 2021-05-17 PROCEDURE — 99214 OFFICE O/P EST MOD 30 MIN: CPT | Performed by: FAMILY MEDICINE

## 2021-05-17 RX ORDER — TRAZODONE HYDROCHLORIDE 50 MG/1
50 TABLET ORAL
Qty: 90 TAB | Refills: 3 | Status: SHIPPED | OUTPATIENT
Start: 2021-05-17 | End: 2022-05-23 | Stop reason: SDUPTHER

## 2021-05-17 RX ORDER — ZOLPIDEM TARTRATE 5 MG/1
TABLET ORAL
Qty: 15 TAB | Refills: 0 | Status: SHIPPED | OUTPATIENT
Start: 2021-05-17 | End: 2022-05-23 | Stop reason: SDUPTHER

## 2021-05-17 RX ORDER — SIMVASTATIN 5 MG/1
5 TABLET, FILM COATED ORAL
Qty: 90 TAB | Refills: 3 | Status: SHIPPED | OUTPATIENT
Start: 2021-05-17 | End: 2022-05-23 | Stop reason: SDUPTHER

## 2021-05-17 NOTE — PROGRESS NOTES
Ambreen Mcgee is a 77 y.o. female who presents to the office today with the following:  Chief Complaint   Patient presents with    Medication Refill     all due       HPI  Hyperlipidemia  Fasting for BW    HTN  No SE of meds  Needs all refills    Insomnia  May start traveling soon and needs Ambien refilled  Otherwise taking Trazodone every day    Sore throat on and off  And swollen glands    ROS  See HPI. Past Medical History:   Diagnosis Date    BCC (basal cell carcinoma of skin)     Cervical stenosis of spinal canal     Chronic UTI     Hypercholesterolemia     Hyperlipidemia    Hypertension     MGUS (monoclonal gammopathy of unknown significance)     Microhematuria     seen by Dr Jonathan Deutsch MVP (mitral valve prolapse)     Tricuspid regurgitation    Osteoporosis 2015.01.02    bone density exam    PSVT (paroxysmal supraventricular tachycardia) ([facility] [address]        Past Surgical History:   Procedure Laterality Date    HX BREAST AUGMENTATION  2003    HX BREAST BIOPSY      HX COLONOSCOPY  08/24/2006 and 1/18    scanned, every 10 y    HX ENDOSCOPY  08/06    HX ORTHOPAEDIC      HX TONSILLECTOMY      NEUROLOGICAL PROCEDURE UNLISTED      cervical disc fusion 1/2006    RI CHEST SURGERY PROCEDURE UNLISTED  as infant    pectus excavatum       No Known Allergies    Current Outpatient Medications   Medication Sig    simvastatin (ZOCOR) 5 mg tablet Take 1 Tab by mouth nightly.  traZODone (DESYREL) 50 mg tablet Take 1 Tab by mouth nightly.  zolpidem (AMBIEN) 5 mg tablet 1/2 - 1 qhs prn    fluticasone propionate (FLONASE) 50 mcg/actuation nasal spray 2 Sprays by Both Nostrils route daily.  abaloparatide (TYMLOS) 80 mcg (3,120 mcg/1.56 mL) pnij by SubCUTAneous route daily.  metoprolol succinate (TOPROL-XL) 25 mg XL tablet Take 1 Tab by mouth daily.  lisinopril (PRINIVIL, ZESTRIL) 5 mg tablet Take 5 mg by mouth. No current facility-administered medications for this visit.         Social History Socioeconomic History    Marital status:      Spouse name: Not on file    Number of children: Not on file    Years of education: Not on file    Highest education level: Not on file   Tobacco Use    Smoking status: Never Smoker    Smokeless tobacco: Never Used   Substance and Sexual Activity    Alcohol use: Yes     Alcohol/week: 1.7 standard drinks     Comment: social weekender    Drug use: No    Sexual activity: Yes     Partners: Male   Social History Narrative    ** Merged History Encounter **            Family History   Problem Relation Age of Onset    Cancer Maternal Grandmother     Cancer Maternal Grandfather     Obesity Mother     Emphysema Father     Diabetes Sister     Diabetes Brother          Physical Exam:  Visit Vitals  /74 (BP 1 Location: Left upper arm)   Pulse 64   Temp 97.5 °F (36.4 °C)   Resp 12   Ht 5' 4\" (1.626 m)   Wt 126 lb (57.2 kg)   SpO2 99%   BMI 21.63 kg/m²     Physical Exam  Vitals signs and nursing note reviewed. Constitutional:       Appearance: She is normal weight. HENT:      Head: Normocephalic and atraumatic. Right Ear: Tympanic membrane, ear canal and external ear normal.      Left Ear: Tympanic membrane, ear canal and external ear normal.   Eyes:      Extraocular Movements: Extraocular movements intact. Conjunctiva/sclera: Conjunctivae normal.      Pupils: Pupils are equal, round, and reactive to light. Cardiovascular:      Rate and Rhythm: Normal rate and regular rhythm. Pulses: Normal pulses. Heart sounds: Normal heart sounds. Pulmonary:      Breath sounds: Normal breath sounds. Abdominal:      General: Abdomen is flat. Palpations: Abdomen is soft. Musculoskeletal:      Right lower leg: No edema. Left lower leg: No edema. Lymphadenopathy:      Cervical: Cervical adenopathy present. Skin:     General: Skin is warm and dry.    Neurological:      Mental Status: She is alert and oriented to person, place, and time.   Psychiatric:         Mood and Affect: Mood normal.         Behavior: Behavior normal.       Recent Results (from the past 12 hour(s))   AMB POC RAPID STREP A    Collection Time: 05/17/21  8:00 AM   Result Value Ref Range    VALID INTERNAL CONTROL POC Yes     Group A Strep Ag Negative Negative         Assessment/Plan:    ICD-10-CM ICD-9-CM    1. Hyperlipidemia, unspecified hyperlipidemia type  E78.5 272.4 LIPID PANEL      simvastatin (ZOCOR) 5 mg tablet   2. Insomnia, unspecified type  G47.00 780.52 traZODone (DESYREL) 50 mg tablet   3. Jet lag  G47.25 327.35 zolpidem (AMBIEN) 5 mg tablet   4. Essential hypertension  I10 401.9 CBC WITH AUTOMATED DIFF      METABOLIC PANEL, COMPREHENSIVE   5. Encounter for immunization  Z23 V03.89 PNEUMOCOCCAL POLYSACCHARIDE VACCINE, 23-VALENT, ADULT OR IMMUNOSUPPRESSED PT DOSE,   6.  Sore throat  J02.9 462 AMB POC RAPID STREP A           Trixie Pressley MD

## 2021-05-17 NOTE — ACP (ADVANCE CARE PLANNING)
Non-Provider Advance Care Planning (ACP) Note    Date of ACP Conversation: 5/17/2021  Persons included in Conversation: patient  Length of ACP Conversation in minutes: <16 minutes (Non-Billable)    Conversation requested by:   Provider    Authorized Decision Maker (if patient is incapable of making informed decisions):    This person is:  Next of Kin by law (only applies in absence of a Healthcare Power of  or Legal Guardian)        General ACP for ALL Patients with Decision Making Capacity:    Advance Directive Conversation with Patients who have not yet planned:  Importance of advance care planning, including choosing a healthcare agent to communicate patient's healthcare decisions if patient lost the ability to make decisions, such as after a sudden illness or accident    Review of Existing Advance Directive: (Select questions covered)  na    Interventions Provided:  Provided ACP educational materials:  Conversation Starter Kit  Advance Directive Form

## 2021-05-17 NOTE — PROGRESS NOTES
1. Have you been to the ER, urgent care clinic since your last visit? Hospitalized since your last visit? No    2. Have you seen or consulted any other health care providers outside of the [address] [place] [address] since your last visit? Include any pap smears or colon screening.  No

## 2021-05-18 LAB
ALBUMIN SERPL-MCNC: 4 G/DL (ref 3.5–5)
ALBUMIN/GLOB SERPL: 1.3 {RATIO} (ref 1.1–2.2)
ALP SERPL-CCNC: 83 U/L (ref 45–117)
ALT SERPL-CCNC: 18 U/L (ref 12–78)
ANION GAP SERPL CALC-SCNC: 4 MMOL/L (ref 5–15)
AST SERPL-CCNC: 18 U/L (ref 15–37)
BASOPHILS # BLD: 0.1 K/UL (ref 0–0.1)
BASOPHILS NFR BLD: 1 % (ref 0–1)
BILIRUB SERPL-MCNC: 0.6 MG/DL (ref 0.2–1)
BUN SERPL-MCNC: 15 MG/DL (ref 6–20)
BUN/CREAT SERPL: 21 (ref 12–20)
CALCIUM SERPL-MCNC: 9.4 MG/DL (ref 8.5–10.1)
CHLORIDE SERPL-SCNC: 105 MMOL/L (ref 97–108)
CHOLEST SERPL-MCNC: 188 MG/DL
CO2 SERPL-SCNC: 32 MMOL/L (ref 21–32)
CREAT SERPL-MCNC: 0.72 MG/DL (ref 0.55–1.02)
DIFFERENTIAL METHOD BLD: ABNORMAL
EOSINOPHIL # BLD: 0.2 K/UL (ref 0–0.4)
EOSINOPHIL NFR BLD: 4 % (ref 0–7)
ERYTHROCYTE [DISTWIDTH] IN BLOOD BY AUTOMATED COUNT: 13.2 % (ref 11.5–14.5)
GLOBULIN SER CALC-MCNC: 3.1 G/DL (ref 2–4)
GLUCOSE SERPL-MCNC: 111 MG/DL (ref 65–100)
HCT VFR BLD AUTO: 43.3 % (ref 35–47)
HDLC SERPL-MCNC: 64 MG/DL
HDLC SERPL: 2.9 {RATIO} (ref 0–5)
HGB BLD-MCNC: 13.9 G/DL (ref 11.5–16)
IMM GRANULOCYTES # BLD AUTO: 0 K/UL (ref 0–0.04)
IMM GRANULOCYTES NFR BLD AUTO: 0 % (ref 0–0.5)
LDLC SERPL CALC-MCNC: 99.4 MG/DL (ref 0–100)
LYMPHOCYTES # BLD: 1.7 K/UL (ref 0.8–3.5)
LYMPHOCYTES NFR BLD: 32 % (ref 12–49)
MCH RBC QN AUTO: 33.3 PG (ref 26–34)
MCHC RBC AUTO-ENTMCNC: 32.1 G/DL (ref 30–36.5)
MCV RBC AUTO: 103.6 FL (ref 80–99)
MONOCYTES # BLD: 0.5 K/UL (ref 0–1)
MONOCYTES NFR BLD: 10 % (ref 5–13)
NEUTS SEG # BLD: 2.8 K/UL (ref 1.8–8)
NEUTS SEG NFR BLD: 53 % (ref 32–75)
NRBC # BLD: 0 K/UL (ref 0–0.01)
NRBC BLD-RTO: 0 PER 100 WBC
PLATELET # BLD AUTO: 194 K/UL (ref 150–400)
PMV BLD AUTO: 9.9 FL (ref 8.9–12.9)
POTASSIUM SERPL-SCNC: 4.6 MMOL/L (ref 3.5–5.1)
PROT SERPL-MCNC: 7.1 G/DL (ref 6.4–8.2)
RBC # BLD AUTO: 4.18 M/UL (ref 3.8–5.2)
SODIUM SERPL-SCNC: 141 MMOL/L (ref 136–145)
TRIGL SERPL-MCNC: 123 MG/DL (ref ?–150)
VLDLC SERPL CALC-MCNC: 24.6 MG/DL
WBC # BLD AUTO: 5.3 K/UL (ref 3.6–11)

## 2021-05-18 NOTE — PROGRESS NOTES
Spoke with patient after verifying Name, and , informed patient of lab results and recommendations per Dr. Venora Dakin . Patient given an opportunity to ask questions, repeated information, and verbalized understanding.

## 2021-05-18 NOTE — PROGRESS NOTES
Call pt, the Chol is good  The electrolytes, kidney and liver tests are normal  The sugar is a little up, we will monitor  The CBC is ok

## 2021-11-02 ENCOUNTER — VIRTUAL VISIT (OUTPATIENT)
Dept: FAMILY MEDICINE CLINIC | Age: 66
End: 2021-11-02
Payer: COMMERCIAL

## 2021-11-02 DIAGNOSIS — R68.89 FLU-LIKE SYMPTOMS: Primary | ICD-10-CM

## 2021-11-02 LAB
FLUAV+FLUBV AG NOSE QL IA.RAPID: NEGATIVE
FLUAV+FLUBV AG NOSE QL IA.RAPID: NEGATIVE
VALID INTERNAL CONTROL?: YES

## 2021-11-02 PROCEDURE — 99213 OFFICE O/P EST LOW 20 MIN: CPT | Performed by: FAMILY MEDICINE

## 2021-11-02 RX ORDER — BISMUTH SUBSALICYLATE 262 MG
1 TABLET,CHEWABLE ORAL DAILY
COMMUNITY

## 2021-11-02 RX ORDER — MELATONIN
DAILY
COMMUNITY

## 2021-11-02 NOTE — PROGRESS NOTES
1. Have you been to the ER, urgent care clinic since your last visit? Hospitalized since your last visit? No    2. Have you seen or consulted any other health care providers outside of the [address] [place] [address] since your last visit? Include any pap smears or colon screening.  Yes Where: Endocrinology and Hematology

## 2021-11-02 NOTE — PROGRESS NOTES
Adali Villagomez is a 77 y.o. female who was seen by synchronous (real-time) audio-video technology on 11/2/2021 for Fever (cough, congesetion, and sore throat, fully vaccinated, neg Covid to get on plane x 2 times, ST started on Sunday, cough, ears feel full, eyes hurt)        Assessment & Plan:   Diagnoses and all orders for this visit:    1. Flu-like symptoms  -     AMB POC CHER INFLUENZA A/B TEST  -     NOVEL CORONAVIRUS (COVID-19)      May take Robitussin DM and Tylenol cold and sinus if needed        Subjective:     Working remotely for months  Had first mandatory trip  Flew to [place]   Had neg test before the flight  And last week, 5 d ago before flying back    Was tired on Sat  Sun got sinus congestion, ears full  Yesterday really bad cough  Last night chils and cough, unproductive  Sore throat  Still has taste  No diarrhea or vomiting  This am 100.8 fever  Took Advil an hour ago  Went to [address] [place] [address] yesterday VS were fine    Grand daughter is coming this weekend, late tomorrow or Thu who is immune compromised      Prior to Admission medications    Medication Sig Start Date End Date Taking? Authorizing Provider   cholecalciferol (Vitamin D3) (1000 Units /25 mcg) tablet Take  by mouth daily. Yes Provider, Historical   multivitamin (ONE A DAY) tablet Take 1 Tablet by mouth daily. Yes Provider, Historical   simvastatin (ZOCOR) 5 mg tablet Take 1 Tab by mouth nightly. 5/17/21  Yes Terri Durán MD   traZODone (DESYREL) 50 mg tablet Take 1 Tab by mouth nightly. 5/17/21  Yes Terri Durán MD   fluticasone propionate (FLONASE) 50 mcg/actuation nasal spray 2 Sprays by Both Nostrils route daily. 3/23/20  Yes Terri Durán MD   metoprolol succinate (TOPROL-XL) 25 mg XL tablet Take 1 Tab by mouth daily. 2/11/16  Yes Terri Durán MD   lisinopril (PRINIVIL, ZESTRIL) 5 mg tablet Take 5 mg by mouth.    Yes Provider, Historical   zolpidem (AMBIEN) 5 mg tablet 1/2 - 1 qhs prn  Patient not taking: Reported on 11/2/2021 5/17/21   Yaz Durán MD   abaloparatide (TYMLOS) 80 mcg (3,120 mcg/1.56 mL) pnij by SubCUTAneous route daily. Patient not taking: Reported on 11/2/2021    Provider, Historical     Patient Active Problem List   Diagnosis Code    Hypertension I10    Hyperlipidemia E78.5    Vitamin D deficiency E55.9    Insomnia G47.00    Osteoporosis M81.0    Perennial allergic rhinitis J30.89    PSVT (paroxysmal supraventricular tachycardia) ([facility]) I47.1     Patient Active Problem List    Diagnosis Date Noted    PSVT (paroxysmal supraventricular tachycardia) ([facility])     Perennial allergic rhinitis 01/03/2017    Insomnia 11/23/2015    Osteoporosis 11/23/2015    Vitamin D deficiency 12/18/2014    Hypertension 11/12/2013    Hyperlipidemia 11/12/2013     Current Outpatient Medications   Medication Sig Dispense Refill    cholecalciferol (Vitamin D3) (1000 Units /25 mcg) tablet Take  by mouth daily.  multivitamin (ONE A DAY) tablet Take 1 Tablet by mouth daily.  simvastatin (ZOCOR) 5 mg tablet Take 1 Tab by mouth nightly. 90 Tab 3    traZODone (DESYREL) 50 mg tablet Take 1 Tab by mouth nightly. 90 Tab 3    fluticasone propionate (FLONASE) 50 mcg/actuation nasal spray 2 Sprays by Both Nostrils route daily. 48 g 3    metoprolol succinate (TOPROL-XL) 25 mg XL tablet Take 1 Tab by mouth daily. 90 Tab 2    lisinopril (PRINIVIL, ZESTRIL) 5 mg tablet Take 5 mg by mouth.  zolpidem (AMBIEN) 5 mg tablet 1/2 - 1 qhs prn (Patient not taking: Reported on 11/2/2021) 15 Tab 0    abaloparatide (TYMLOS) 80 mcg (3,120 mcg/1.56 mL) pnij by SubCUTAneous route daily.  (Patient not taking: Reported on 11/2/2021)       No Known Allergies  Past Medical History:   Diagnosis Date    BCC (basal cell carcinoma of skin)     Cervical stenosis of spinal canal     Chronic UTI     Hypercholesterolemia     Hyperlipidemia    Hypertension     MGUS (monoclonal gammopathy of unknown significance)  Microhematuria     seen by Dr Rica Ball    MVP (mitral valve prolapse)     Tricuspid regurgitation    Osteoporosis 2015.01.02    bone density exam    PSVT (paroxysmal supraventricular tachycardia) ([address]      Past Surgical History:   Procedure Laterality Date    HX BREAST AUGMENTATION  2003    HX BREAST BIOPSY      HX COLONOSCOPY  08/24/2006 and 1/18    scanned, every 10 y    HX ENDOSCOPY  08/06    HX ORTHOPAEDIC      HX TONSILLECTOMY      NEUROLOGICAL PROCEDURE UNLISTED      cervical disc fusion 1/2006    HI CHEST SURGERY PROCEDURE UNLISTED  as infant    pectus excavatum     Family History   Problem Relation Age of Onset    Cancer Maternal Grandmother     Cancer Maternal Grandfather     Obesity Mother     Emphysema Father     Diabetes Sister     Diabetes Brother      Social History     Tobacco Use    Smoking status: Never Smoker    Smokeless tobacco: Never Used   Substance Use Topics    Alcohol use: Yes     Alcohol/week: 1.7 standard drinks     Comment: social weekender       ROS    Objective:   No flowsheet data found.      [INSTRUCTIONS:  \"[x]\" Indicates a positive item  \"[]\" Indicates a negative item  -- DELETE ALL ITEMS NOT EXAMINED]    Constitutional: [x] Appears well-developed and well-nourished [x] No apparent distress      [] Abnormal -     Mental status: [x] Alert and awake  [x] Oriented to person/place/time [x] Able to follow commands    [] Abnormal -     Eyes:   EOM    [x]  Normal    [] Abnormal -   Sclera  [x]  Normal    [] Abnormal -          Discharge [x]  None visible   [] Abnormal -     HENT: [x] Normocephalic, atraumatic  [] Abnormal -   [x] Mouth/Throat: Mucous membranes are moist    External Ears [x] Normal  [] Abnormal -    Neck: [x] No visualized mass [] Abnormal -     Pulmonary/Chest: [x] Respiratory effort normal   [x] No visualized signs of difficulty breathing or respiratory distress        [] Abnormal -      Musculoskeletal:   [x] Normal gait with no signs of ataxia [x] Normal range of motion of neck        [] Abnormal -     Neurological:        [x] No Facial Asymmetry (Cranial nerve 7 motor function) (limited exam due to video visit)          [x] No gaze palsy        [] Abnormal -          Skin:        [x] No significant exanthematous lesions or discoloration noted on facial skin         [] Abnormal -            Psychiatric:       [x] Normal Affect [] Abnormal -        [x] No Hallucinations    Other pertinent observable physical exam findings:-        We discussed the expected course, resolution and complications of the diagnosis(es) in detail. Medication risks, benefits, costs, interactions, and alternatives were discussed as indicated. I advised her to contact the office if her condition worsens, changes or fails to improve as anticipated. She expressed understanding with the diagnosis(es) and plan. [place], was evaluated through a synchronous (real-time) audio-video encounter. The patient (or guardian if applicable) is aware that this is a billable service. Verbal consent to proceed has been obtained within the past 12 months. The visit was conducted pursuant to the emergency declaration under the [address] [place] [address] authority and the [employer] General Act. Patient identification was verified, and a caregiver was present when appropriate. The patient was located in a state where the provider was credentialed to provide care.       Tracy Cullen MD

## 2021-11-04 LAB
SARS-COV-2, NAA 2 DAY TAT: NORMAL
SARS-COV-2, NAA: NOT DETECTED

## 2021-11-05 ENCOUNTER — TELEPHONE (OUTPATIENT)
Dept: FAMILY MEDICINE CLINIC | Age: 66
End: 2021-11-05

## 2021-11-05 NOTE — TELEPHONE ENCOUNTER
I have called and gave her the message from Dr Alfredito Velasquez to treat her symptoms and follow up next week if not better. Patient reports that she has felt bed since Sunday, and is not improving. Now her ears hurt and her teeth on the left side hurt. She has been using OTC medications like a cough suppressant, Mucinex, and Advil, as well as drinking more fluids. She is coughing up what looks to be infection and is dark and purulent. 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 She does not want to keep getting worse over the weekend. She would like an ABX sent in for her please.

## 2021-11-05 NOTE — TELEPHONE ENCOUNTER
Pt was wondering if her message has been responded to yet. Pt would like Dr. Dwayne Nix to prescribe something since Dr. Hero Roldan is not in till Tuesday.      Best number for call back is

## 2021-11-05 NOTE — TELEPHONE ENCOUNTER
Patient states she is feeling much worse, and she now has discoloration in her mucas. 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 Please send to [facility].

## 2021-11-05 NOTE — TELEPHONE ENCOUNTER
Spoke to patient after verifying two identifiers, informed per provider to continue to treat her symptoms with OTC medications. Advised to let us know if her symptoms worsened. Acknowledged understanding.

## 2022-03-19 PROBLEM — J30.89 PERENNIAL ALLERGIC RHINITIS: Status: ACTIVE | Noted: 2017-01-03

## 2022-05-23 ENCOUNTER — OFFICE VISIT (OUTPATIENT)
Dept: FAMILY MEDICINE CLINIC | Age: 67
End: 2022-05-23
Payer: COMMERCIAL

## 2022-05-23 VITALS
SYSTOLIC BLOOD PRESSURE: 124 MMHG | TEMPERATURE: 97.2 F | HEART RATE: 55 BPM | OXYGEN SATURATION: 96 % | WEIGHT: 125 LBS | DIASTOLIC BLOOD PRESSURE: 69 MMHG | RESPIRATION RATE: 14 BRPM | BODY MASS INDEX: 21.34 KG/M2 | HEIGHT: 64 IN

## 2022-05-23 DIAGNOSIS — E78.5 HYPERLIPIDEMIA, UNSPECIFIED HYPERLIPIDEMIA TYPE: Primary | ICD-10-CM
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
## 2022-05-23 DIAGNOSIS — S10.86XA TICK BITE OF OTHER PART OF NECK, INITIAL ENCOUNTER: ICD-10-CM

## 2022-05-23 DIAGNOSIS — G47.25 JET LAG: ICD-10-CM

## 2022-05-23 DIAGNOSIS — I47.1 PSVT (PAROXYSMAL SUPRAVENTRICULAR TACHYCARDIA) (HCC): ICD-10-CM

## 2022-05-23 DIAGNOSIS — G47.00 INSOMNIA, UNSPECIFIED TYPE: ICD-10-CM

## 2022-05-23 PROCEDURE — 99214 OFFICE O/P EST MOD 30 MIN: CPT | Performed by: FAMILY MEDICINE

## 2022-05-23 RX ORDER — ZINC GLUCONATE 50 MG
50 TABLET ORAL
COMMUNITY

## 2022-05-23 RX ORDER — SIMVASTATIN 5 MG/1
5 TABLET, FILM COATED ORAL
Qty: 90 TABLET | Refills: 3 | Status: SHIPPED | OUTPATIENT
Start: 2022-05-23

## 2022-05-23 RX ORDER — TRAZODONE HYDROCHLORIDE 50 MG/1
50 TABLET ORAL
Qty: 90 TABLET | Refills: 3 | Status: SHIPPED | OUTPATIENT
Start: 2022-05-23

## 2022-05-23 RX ORDER — IBUPROFEN 200 MG
CAPSULE ORAL
COMMUNITY

## 2022-05-23 RX ORDER — ZOLPIDEM TARTRATE 5 MG/1
TABLET ORAL
Qty: 15 TABLET | Refills: 0 | Status: SHIPPED | OUTPATIENT
Start: 2022-05-23

## 2022-05-23 NOTE — PROGRESS NOTES
1. \"Have you been to the ER, urgent care clinic since your last visit? Hospitalized since your last visit? \" No    2. \"Have you seen or consulted any other health care providers outside of the [address] [place] [address] since your last visit? \" No     3. For patients aged 39-70: Has the patient had a colonoscopy / FIT/ Cologuard? Yes - no Care Gap present      If the patient is female:    4. For patients aged 41-77: Has the patient had a mammogram within the past 2 years? Yes - no Care Gap present      5. For patients aged 21-65: Has the patient had a pap smear?  Yes - no Care Gap present

## 2022-05-23 NOTE — PROGRESS NOTES
Merline Sellers is a 74 y.o. female who presents to the office today with the following:  Chief Complaint   Patient presents with    Medication Refill       HPI  HTN  BP is good  Sees Card in a mo    Hyperlipidemia  No SE with meds  Fasting for BW    Thinks she had a tick 10 d ago at left neck  Would like to be checked for Lyme and RMSF    Will be traveling more and needs Ambien for jet lag    Review of Systems   Constitutional: Negative for fever. HENT: Negative for congestion. Respiratory: Negative for cough. Cardiovascular: Negative for chest pain. Musculoskeletal: Negative for myalgias. Skin: Negative for rash. See HPI. Past Medical History:   Diagnosis Date    BCC (basal cell carcinoma of skin) 02/2022    with skin graft, on forehead    Cervical stenosis of spinal canal     Chronic UTI     Hypercholesterolemia     Hyperlipidemia    Hypertension     MGUS (monoclonal gammopathy of unknown significance)     Microhematuria     seen by Dr Jeane Huertas MVP (mitral valve prolapse)     Tricuspid regurgitation    Osteoporosis 2015.01.02    bone density exam    PSVT (paroxysmal supraventricular tachycardia) [facility] [address]        Past Surgical History:   Procedure Laterality Date    HX BREAST AUGMENTATION  2003    HX BREAST BIOPSY      HX COLONOSCOPY  08/24/2006 and 1/18    scanned, every 10 y    HX ENDOSCOPY  08/06    HX ORTHOPAEDIC      HX TONSILLECTOMY      NEUROLOGICAL PROCEDURE UNLISTED      cervical disc fusion 1/2006    ME CHEST SURGERY PROCEDURE UNLISTED  as infant    pectus excavatum       No Known Allergies    Current Outpatient Medications   Medication Sig    ibuprofen 200 mg cap Take  by mouth.  traZODone (DESYREL) 50 mg tablet Take 1 Tablet by mouth nightly.  simvastatin (ZOCOR) 5 mg tablet Take 1 Tablet by mouth nightly.  zolpidem (AMBIEN) 5 mg tablet 1/2 - 1 qhs prn    cholecalciferol (Vitamin D3) (1000 Units /25 mcg) tablet Take  by mouth daily.     multivitamin (ONE A DAY) tablet Take 1 Tablet by mouth daily.  fluticasone propionate (FLONASE) 50 mcg/actuation nasal spray 2 Sprays by Both Nostrils route daily.  metoprolol succinate (TOPROL-XL) 25 mg XL tablet Take 1 Tab by mouth daily.  lisinopril (PRINIVIL, ZESTRIL) 5 mg tablet Take 5 mg by mouth.  zinc gluconate 50 mg tablet Take 50 mg by mouth. No current facility-administered medications for this visit. Social History     Socioeconomic History    Marital status:    Tobacco Use    Smoking status: Never Smoker    Smokeless tobacco: Never Used   Substance and Sexual Activity    Alcohol use: Yes     Alcohol/week: 1.7 standard drinks     Comment: social weekender    Drug use: No    Sexual activity: Yes     Partners: Male   Social History Narrative    ** Merged History Encounter **            Family History   Problem Relation Age of Onset    Cancer Maternal Grandmother     Cancer Maternal Grandfather     Obesity Mother     Emphysema Father     Diabetes Sister     Diabetes Brother          Physical Exam:  Visit Vitals  /69   Pulse (!) 55   Temp 97.2 °F (36.2 °C)   Resp 14   Ht 5' 4\" (1.626 m)   Wt 125 lb (56.7 kg)   SpO2 96%   BMI 21.46 kg/m²     Physical Exam  Vitals and nursing note reviewed. Constitutional:       Appearance: She is normal weight. HENT:      Head: Normocephalic and atraumatic. Right Ear: Tympanic membrane, ear canal and external ear normal.      Left Ear: Tympanic membrane, ear canal and external ear normal.   Eyes:      Conjunctiva/sclera: Conjunctivae normal.   Cardiovascular:      Rate and Rhythm: Normal rate and regular rhythm. Pulses: Normal pulses. Heart sounds: Normal heart sounds. Pulmonary:      Breath sounds: Normal breath sounds. Abdominal:      General: Abdomen is flat. Palpations: Abdomen is soft. Musculoskeletal:      Right lower leg: No edema. Left lower leg: No edema.    Lymphadenopathy:      Cervical: No cervical adenopathy. Skin:     General: Skin is warm and dry. Neurological:      Mental Status: She is alert and oriented to person, place, and time. Psychiatric:         Mood and Affect: Mood normal.         Behavior: Behavior normal.         Assessment/Plan:    ICD-10-CM ICD-9-CM    1. Hyperlipidemia, unspecified hyperlipidemia type  E78.5 272.4 LIPID PANEL      METABOLIC PANEL, COMPREHENSIVE      simvastatin (ZOCOR) 5 mg tablet   2. PSVT (paroxysmal supraventricular tachycardia) (HCC)  I47.1 427.0 CBC WITH AUTOMATED DIFF   3. Insomnia, unspecified type  G47.00 780.52 traZODone (DESYREL) 50 mg tablet   4. Jet lag  G47.25 327.35 zolpidem (AMBIEN) 5 mg tablet   5. Tick bite of other part of neck, initial encounter  S10.86XA 910.4 R RICKETTSII AB IGG W/REFL    W57. XXXA E906.4 LYME AB, IGG & IGM BY BUD Edwards MD

## 2022-05-24 LAB
ALBUMIN SERPL-MCNC: 3.6 G/DL (ref 3.5–5)
ALBUMIN/GLOB SERPL: 1.2 {RATIO} (ref 1.1–2.2)
ALP SERPL-CCNC: 60 U/L (ref 45–117)
ALT SERPL-CCNC: 18 U/L (ref 12–78)
ANION GAP SERPL CALC-SCNC: 3 MMOL/L (ref 5–15)
AST SERPL-CCNC: 19 U/L (ref 15–37)
BASOPHILS # BLD: 0.1 K/UL (ref 0–0.1)
BASOPHILS NFR BLD: 1 % (ref 0–1)
BILIRUB SERPL-MCNC: 0.8 MG/DL (ref 0.2–1)
BUN SERPL-MCNC: 14 MG/DL (ref 6–20)
BUN/CREAT SERPL: 21 (ref 12–20)
CALCIUM SERPL-MCNC: 8.9 MG/DL (ref 8.5–10.1)
CHLORIDE SERPL-SCNC: 107 MMOL/L (ref 97–108)
CHOLEST SERPL-MCNC: 198 MG/DL
CO2 SERPL-SCNC: 31 MMOL/L (ref 21–32)
CREAT SERPL-MCNC: 0.67 MG/DL (ref 0.55–1.02)
DIFFERENTIAL METHOD BLD: ABNORMAL
EOSINOPHIL # BLD: 0.1 K/UL (ref 0–0.4)
EOSINOPHIL NFR BLD: 2 % (ref 0–7)
ERYTHROCYTE [DISTWIDTH] IN BLOOD BY AUTOMATED COUNT: 12.5 % (ref 11.5–14.5)
GLOBULIN SER CALC-MCNC: 3.1 G/DL (ref 2–4)
GLUCOSE SERPL-MCNC: 109 MG/DL (ref 65–100)
HCT VFR BLD AUTO: 46.2 % (ref 35–47)
HDLC SERPL-MCNC: 68 MG/DL
HDLC SERPL: 2.9 {RATIO} (ref 0–5)
HGB BLD-MCNC: 14.7 G/DL (ref 11.5–16)
IMM GRANULOCYTES # BLD AUTO: 0 K/UL (ref 0–0.04)
IMM GRANULOCYTES NFR BLD AUTO: 0 % (ref 0–0.5)
LDLC SERPL CALC-MCNC: 111 MG/DL (ref 0–100)
LYMPHOCYTES # BLD: 1.5 K/UL (ref 0.8–3.5)
LYMPHOCYTES NFR BLD: 30 % (ref 12–49)
MCH RBC QN AUTO: 33 PG (ref 26–34)
MCHC RBC AUTO-ENTMCNC: 31.8 G/DL (ref 30–36.5)
MCV RBC AUTO: 103.8 FL (ref 80–99)
MONOCYTES # BLD: 0.5 K/UL (ref 0–1)
MONOCYTES NFR BLD: 9 % (ref 5–13)
NEUTS SEG # BLD: 3 K/UL (ref 1.8–8)
NEUTS SEG NFR BLD: 58 % (ref 32–75)
NRBC # BLD: 0 K/UL (ref 0–0.01)
NRBC BLD-RTO: 0 PER 100 WBC
PLATELET # BLD AUTO: 199 K/UL (ref 150–400)
PMV BLD AUTO: 10 FL (ref 8.9–12.9)
POTASSIUM SERPL-SCNC: 4.6 MMOL/L (ref 3.5–5.1)
PROT SERPL-MCNC: 6.7 G/DL (ref 6.4–8.2)
RBC # BLD AUTO: 4.45 M/UL (ref 3.8–5.2)
SODIUM SERPL-SCNC: 141 MMOL/L (ref 136–145)
TRIGL SERPL-MCNC: 95 MG/DL (ref ?–150)
VLDLC SERPL CALC-MCNC: 19 MG/DL
WBC # BLD AUTO: 5.2 K/UL (ref 3.6–11)

## 2022-05-24 NOTE — PROGRESS NOTES
Call pt, the electrolytes and liver tests are normal  The kidney tests are ok  The Glucose is a touch up, we will monitor  The CBC is ok  The Chol is good

## 2022-05-27 LAB — R RICKETTSI IGG SER QL IA: NEGATIVE

## 2022-05-27 NOTE — PROGRESS NOTES
I have called and talked to this patient. I have verified the patient's name and . I have discussed the lab results and recommendations from Dr Nikky Carolina. Patient verbalizes understanding at this time.

## 2022-06-07 LAB
B BURGDOR IGG PATRN SER IB-IMP: NEGATIVE
B BURGDOR IGM PATRN SER IB-IMP: NEGATIVE
B BURGDOR18KD IGG SER QL IB: ABNORMAL
B BURGDOR23KD IGG SER QL IB: ABNORMAL
B BURGDOR23KD IGM SER QL IB: ABNORMAL
B BURGDOR28KD IGG SER QL IB: ABNORMAL
B BURGDOR30KD IGG SER QL IB: ABNORMAL
B BURGDOR39KD IGG SER QL IB: ABNORMAL
B BURGDOR39KD IGM SER QL IB: ABNORMAL
B BURGDOR41KD IGG SER QL IB: PRESENT
B BURGDOR41KD IGM SER QL IB: ABNORMAL
B BURGDOR45KD IGG SER QL IB: ABNORMAL
B BURGDOR58KD IGG SER QL IB: ABNORMAL
B BURGDOR66KD IGG SER QL IB: ABNORMAL
B BURGDOR93KD IGG SER QL IB: ABNORMAL

## 2022-06-13 ENCOUNTER — OFFICE VISIT (OUTPATIENT)
Dept: FAMILY MEDICINE CLINIC | Age: 67
End: 2022-06-13
Payer: COMMERCIAL

## 2022-06-13 VITALS
RESPIRATION RATE: 14 BRPM | BODY MASS INDEX: 21.34 KG/M2 | HEART RATE: 77 BPM | WEIGHT: 125 LBS | TEMPERATURE: 97.3 F | SYSTOLIC BLOOD PRESSURE: 113 MMHG | HEIGHT: 64 IN | OXYGEN SATURATION: 97 % | DIASTOLIC BLOOD PRESSURE: 74 MMHG

## 2022-06-13 DIAGNOSIS — M54.2 NECK PAIN: Primary | ICD-10-CM

## 2022-06-13 PROCEDURE — 99213 OFFICE O/P EST LOW 20 MIN: CPT | Performed by: FAMILY MEDICINE

## 2022-06-13 PROCEDURE — 1123F ACP DISCUSS/DSCN MKR DOCD: CPT | Performed by: FAMILY MEDICINE

## 2022-06-13 RX ORDER — NAPROXEN 500 MG/1
500 TABLET ORAL 2 TIMES DAILY WITH MEALS
Qty: 30 TABLET | Refills: 0 | Status: SHIPPED | OUTPATIENT
Start: 2022-06-13

## 2022-06-13 RX ORDER — HYDROCODONE BITARTRATE AND ACETAMINOPHEN 5; 325 MG/1; MG/1
1 TABLET ORAL
Qty: 10 TABLET | Refills: 0 | Status: SHIPPED | OUTPATIENT
Start: 2022-06-13 | End: 2022-06-16

## 2022-06-13 RX ORDER — CYCLOBENZAPRINE HCL 5 MG
TABLET ORAL
Qty: 45 TABLET | Refills: 0 | Status: SHIPPED | OUTPATIENT
Start: 2022-06-13

## 2022-06-13 NOTE — PROGRESS NOTES
1. \"Have you been to the ER, urgent care clinic since your last visit? Hospitalized since your last visit? \" No    2. \"Have you seen or consulted any other health care providers outside of the [address] [place] [address] since your last visit? \" No     3. For patients aged 39-70: Has the patient had a colonoscopy / FIT/ Cologuard? Yes - no Care Gap present      If the patient is female:    4. For patients aged 41-77: Has the patient had a mammogram within the past 2 years? Yes - no Care Gap present      5. For patients aged 21-65: Has the patient had a pap smear?  Yes - no Care Gap present

## 2022-06-13 NOTE — PROGRESS NOTES
Nahum Marmolejo is a 74 y.o. female who presents to the office today with the following:  Chief Complaint   Patient presents with    Neck Pain     6/11/22, severe neck pain, cannot turn, then NandV       HPI    Excruciating pain on left side of neck started 2d ago  Woke up with it  Worse Sat evening  Sharp, shooting  Worse when trying to move  No numbness but tingling in left arm  No loss of strength in arm    Tried Advil 4 mg but did not help  Then tried Lortab 5/325, two tablets and it took the edge off  Also tried heat  No exercises    Nauseated on and off and vomited yesterday and 2x this am    Pain 9-10/10  Never like this before      ROS  See HPI. Past Medical History:   Diagnosis Date    BCC (basal cell carcinoma of skin) 02/2022    with skin graft, on forehead    Cervical stenosis of spinal canal     Chronic UTI     Hypercholesterolemia     Hyperlipidemia    Hypertension     MGUS (monoclonal gammopathy of unknown significance)     Microhematuria     seen by Dr Aman Baig MVP (mitral valve prolapse)     Tricuspid regurgitation    Osteoporosis 2015.01.02    bone density exam    PSVT (paroxysmal [facility]ventricular tachycardia) [facility] [address]        Past Surgical History:   Procedure Laterality Date    HX BREAST AUGMENTATION  2003    HX BREAST BIOPSY      HX COLONOSCOPY  08/24/2006 and 1/18    scanned, every 10 y    HX ENDOSCOPY  08/06    HX ORTHOPAEDIC      HX TONSILLECTOMY      NEUROLOGICAL PROCEDURE UNLISTED      cervical disc fusion 1/2006    DC CHEST SURGERY PROCEDURE UNLISTED  as infant    pectus excavatum       No Known Allergies    Current Outpatient Medications   Medication Sig    cyclobenzaprine (FLEXERIL) 5 mg tablet Take 1 to 2 tablets tid prn    naproxen (NAPROSYN) 500 mg tablet Take 1 Tablet by mouth two (2) times daily (with meals).  HYDROcodone-acetaminophen (NORCO) 5-325 mg per tablet Take 1 Tablet by mouth every four (4) hours as needed for Pain for up to 3 days.  Max Daily Amount: 6 Tablets.  ibuprofen 200 mg cap Take  by mouth.  zinc gluconate 50 mg tablet Take 50 mg by mouth.  traZODone (DESYREL) 50 mg tablet Take 1 Tablet by mouth nightly.  simvastatin (ZOCOR) 5 mg tablet Take 1 Tablet by mouth nightly.  zolpidem (AMBIEN) 5 mg tablet 1/2 - 1 qhs prn    cholecalciferol (Vitamin D3) (1000 Units /25 mcg) tablet Take  by mouth daily.  multivitamin (ONE A DAY) tablet Take 1 Tablet by mouth daily.  fluticasone propionate (FLONASE) 50 mcg/actuation nasal spray 2 Sprays by Both Nostrils route daily.  metoprolol succinate (TOPROL-XL) 25 mg XL tablet Take 1 Tab by mouth daily.  lisinopril (PRINIVIL, ZESTRIL) 5 mg tablet Take 5 mg by mouth. No current facility-administered medications for this visit. Social History     Socioeconomic History    Marital status:    Tobacco Use    Smoking status: Never Smoker    Smokeless tobacco: Never Used   Substance and Sexual Activity    Alcohol use: Yes     Alcohol/week: 1.7 standard drinks     Comment: social weekender    Drug use: No    Sexual activity: Yes     Partners: Male   Social History Narrative    ** Merged History Encounter **            Family History   Problem Relation Age of Onset    Cancer Maternal Grandmother     Cancer Maternal Grandfather     Obesity Mother     Emphysema Father     Diabetes Sister     Diabetes Brother          Physical Exam:  Visit Vitals  /74   Pulse 77   Temp 97.3 °F (36.3 °C)   Resp 14   Ht 5' 4\" (1.626 m)   Wt 125 lb (56.7 kg)   SpO2 97%   BMI 21.46 kg/m²     Physical Exam  Vitals and nursing note reviewed. HENT:      Head: Normocephalic and atraumatic. Eyes:      Extraocular Movements: Extraocular movements intact.       Conjunctiva/sclera: Conjunctivae normal.   Neck:      Comments: Decreased ROM of neck, tender paraspinal muscles and left neck muscle with turning head to left against pressure  Pulmonary:      Effort: Pulmonary effort is normal. Musculoskeletal:      Cervical back: Rigidity and tenderness present. Comments: Normal ROM of shoulders   Lymphadenopathy:      Cervical: No cervical adenopathy. Skin:     General: Skin is warm and dry. Neurological:      Mental Status: She is alert and oriented to person, place, and time. Psychiatric:         Mood and Affect: Mood normal.         Behavior: Behavior normal.         Assessment/Plan:    ICD-10-CM ICD-9-CM    1.  Neck pain  M54.2 723.1 XR SPINE CERV 4 OR 5 V      cyclobenzaprine (FLEXERIL) 5 mg tablet      REFERRAL TO PHYSICAL THERAPY      naproxen (NAPROSYN) 500 mg tablet      HYDROcodone-acetaminophen (NORCO) 5-325 mg per tablet     Cont heat, light exercises  Tylenol 2 ES qid and Naproxen  Flexeril at night and only Norco if needed        Naina Conner MD

## 2022-06-14 DIAGNOSIS — M54.2 NECK PAIN: ICD-10-CM

## 2022-06-15 ENCOUNTER — TELEPHONE (OUTPATIENT)
Dept: FAMILY MEDICINE CLINIC | Age: 67
End: 2022-06-15

## 2022-06-15 NOTE — TELEPHONE ENCOUNTER
Patient has an appointment at [address] this afternoon, and they do not have a referral or any notes for patient. Please advise. Patient is very upset. States she called and spoke to someone here yesterday, but there is no documentation regarding the call.

## 2022-07-08 LAB — MAMMOGRAPHY, EXTERNAL: NEGATIVE

## 2022-12-06 ENCOUNTER — VIRTUAL VISIT (OUTPATIENT)
Dept: FAMILY MEDICINE CLINIC | Age: 67
End: 2022-12-06
Payer: COMMERCIAL

## 2022-12-06 DIAGNOSIS — J01.00 ACUTE NON-RECURRENT MAXILLARY SINUSITIS: Primary | ICD-10-CM

## 2022-12-06 PROCEDURE — 1123F ACP DISCUSS/DSCN MKR DOCD: CPT | Performed by: FAMILY MEDICINE

## 2022-12-06 PROCEDURE — 99213 OFFICE O/P EST LOW 20 MIN: CPT | Performed by: FAMILY MEDICINE

## 2022-12-06 RX ORDER — AMOXICILLIN AND CLAVULANATE POTASSIUM 875; 125 MG/1; MG/1
1 TABLET, FILM COATED ORAL 2 TIMES DAILY
Qty: 20 TABLET | Refills: 0 | Status: SHIPPED | OUTPATIENT
Start: 2022-12-06 | End: 2022-12-16

## 2022-12-06 RX ORDER — BENZONATATE 100 MG/1
100 CAPSULE ORAL
Qty: 21 CAPSULE | Refills: 1 | Status: SHIPPED | OUTPATIENT
Start: 2022-12-06 | End: 2022-12-13

## 2022-12-06 NOTE — PROGRESS NOTES
1. \"Have you been to the ER, urgent care clinic since your last visit? Hospitalized since your last visit? \" No    2. \"Have you seen or consulted any other health care providers outside of the [address] [place] [address] since your last visit? \" No     3. For patients aged 39-70: Has the patient had a colonoscopy / FIT/ Cologuard? Yes - no Care Gap present      If the patient is female:    4. For patients aged 41-77: Has the patient had a mammogram within the past 2 years? Yes - no Care Gap present      5. For patients aged 21-65: Has the patient had a pap smear?  NA - based on age or sex

## 2022-12-06 NOTE — PROGRESS NOTES
Debora Zeng is a 69 y.o. female who was seen by synchronous (real-time) audio-video technology on 12/6/2022 for Hoarse (X 1 week ), Sore Throat, Cough (Greenish Phlegm ), and Pressure Behind the Eyes        Assessment & Plan:   Diagnoses and all orders for this visit:    1. Acute non-recurrent maxillary sinusitis  -     amoxicillin-clavulanate (AUGMENTIN) 875-125 mg per tablet; Take 1 Tablet by mouth two (2) times a day for 10 days. May fill until 12/31/2022  -     benzonatate (TESSALON) 100 mg capsule; Take 1 Capsule by mouth three (3) times daily as needed for Cough for up to 7 days. Symptomatic rx. Delayed abx    Subjective:     One week of URI sxs, laryngitis, ST and non productive cough. Purulent nasal drainage. No fever. Prior to Admission medications    Medication Sig Start Date End Date Taking? Authorizing Provider   amoxicillin-clavulanate (AUGMENTIN) 875-125 mg per tablet Take 1 Tablet by mouth two (2) times a day for 10 days. May fill until 12/31/2022 12/6/22 12/16/22 Yes Mansi Vallejo MD   benzonatate (TESSALON) 100 mg capsule Take 1 Capsule by mouth three (3) times daily as needed for Cough for up to 7 days. 12/6/22 12/13/22 Yes Mansi Vallejo MD   metoprolol succinate (TOPROL-XL) 25 mg XL tablet TAKE ONE TABLET BY MOUTH ONCE A DAY 8/24/22  Yes Wolf-Small, Susann Angelucci, MD   lisinopriL (PRINIVIL, ZESTRIL) 5 mg tablet take 1 tablet by oral route every morning 8/24/22  Yes Terri Durán MD   ibuprofen 200 mg cap Take  by mouth. Yes Provider, Historical   traZODone (DESYREL) 50 mg tablet Take 1 Tablet by mouth nightly. 5/23/22  Yes Terri Durán MD   simvastatin (ZOCOR) 5 mg tablet Take 1 Tablet by mouth nightly. 5/23/22  Yes Wolf-Small, Susann Angelucci, MD   cholecalciferol (VITAMIN D3) (1000 Units /25 mcg) tablet Take  by mouth daily. Yes Provider, Historical   multivitamin (ONE A DAY) tablet Take 1 Tablet by mouth daily.    Yes Provider, Historical   fluticasone propionate (FLONASE) 50 mcg/actuation nasal spray 2 Sprays by Both Nostrils route daily. 3/23/20  Yes Penny Durán MD   cyclobenzaprine (FLEXERIL) 5 mg tablet Take 1 to 2 tablets tid prn  Patient not taking: Reported on 12/6/2022 6/13/22   Penny Durán MD   naproxen (NAPROSYN) 500 mg tablet Take 1 Tablet by mouth two (2) times daily (with meals). Patient not taking: Reported on 12/6/2022 6/13/22   Penny Durán MD   zinc gluconate 50 mg tablet Take 50 mg by mouth. Patient not taking: Reported on 12/6/2022    Provider, Historical   zolpidem (AMBIEN) 5 mg tablet 1/2 - 1 qhs prn  Patient not taking: Reported on 12/6/2022 5/23/22   Sarah Tsai MD     Patient Active Problem List   Diagnosis Code    Hypertension I10    Hyperlipidemia E78.5    Vitamin D deficiency E55.9    Insomnia G47.00    Osteoporosis M81.0    Perennial allergic rhinitis J30.89    PSVT (paroxysmal supraventricular tachycardia) ([facility]) I47.1     Current Outpatient Medications   Medication Sig Dispense Refill    amoxicillin-clavulanate (AUGMENTIN) 875-125 mg per tablet Take 1 Tablet by mouth two (2) times a day for 10 days. May fill until 12/31/2022 20 Tablet 0    benzonatate (TESSALON) 100 mg capsule Take 1 Capsule by mouth three (3) times daily as needed for Cough for up to 7 days. 21 Capsule 1    metoprolol succinate (TOPROL-XL) 25 mg XL tablet TAKE ONE TABLET BY MOUTH ONCE A DAY 90 Tablet 1    lisinopriL (PRINIVIL, ZESTRIL) 5 mg tablet take 1 tablet by oral route every morning 90 Tablet 1    ibuprofen 200 mg cap Take  by mouth. traZODone (DESYREL) 50 mg tablet Take 1 Tablet by mouth nightly. 90 Tablet 3    simvastatin (ZOCOR) 5 mg tablet Take 1 Tablet by mouth nightly. 90 Tablet 3    cholecalciferol (VITAMIN D3) (1000 Units /25 mcg) tablet Take  by mouth daily. multivitamin (ONE A DAY) tablet Take 1 Tablet by mouth daily.       fluticasone propionate (FLONASE) 50 mcg/actuation nasal spray 2 Sprays by Both Nostrils route daily. 48 g 3    cyclobenzaprine (FLEXERIL) 5 mg tablet Take 1 to 2 tablets tid prn (Patient not taking: Reported on 12/6/2022) 45 Tablet 0    naproxen (NAPROSYN) 500 mg tablet Take 1 Tablet by mouth two (2) times daily (with meals). (Patient not taking: Reported on 12/6/2022) 30 Tablet 0    zinc gluconate 50 mg tablet Take 50 mg by mouth. (Patient not taking: Reported on 12/6/2022)      zolpidem (AMBIEN) 5 mg tablet 1/2 - 1 qhs prn (Patient not taking: Reported on 12/6/2022) 15 Tablet 0     No Known Allergies    ROS    Objective:   No flowsheet data found.      [INSTRUCTIONS:  \"[x]\" Indicates a positive item  \"[]\" Indicates a negative item  -- DELETE ALL ITEMS NOT EXAMINED]    Constitutional: [x] Appears well-developed and well-nourished [x] No apparent distress      [] Abnormal -     Mental status: [x] Alert and awake  [x] Oriented to person/place/time [x] Able to follow commands    [] Abnormal -     Eyes:   EOM    [x]  Normal    [] Abnormal -   Sclera  [x]  Normal    [] Abnormal -          Discharge [x]  None visible   [] Abnormal -     HENT: [x] Normocephalic, atraumatic  [] Abnormal -   [x] Mouth/Throat: Mucous membranes are moist    External Ears [x] Normal  [] Abnormal -    Neck: [x] No visualized mass [] Abnormal -     Pulmonary/Chest: [x] Respiratory effort normal   [x] No visualized signs of difficulty breathing or respiratory distress        [] Abnormal -      Musculoskeletal:   [x] Normal gait with no signs of ataxia         [x] Normal range of motion of neck        [] Abnormal -     Neurological:        [x] No Facial Asymmetry (Cranial nerve 7 motor function) (limited exam due to video visit)          [x] No gaze palsy        [] Abnormal -          Skin:        [x] No significant exanthematous lesions or discoloration noted on facial skin         [] Abnormal -            Psychiatric:       [x] Normal Affect [] Abnormal -        [x] No Hallucinations    Other pertinent observable physical exam findings:-        We discussed the expected course, resolution and complications of the diagnosis(es) in detail. Medication risks, benefits, costs, interactions, and alternatives were discussed as indicated. I advised her to contact the office if her condition worsens, changes or fails to improve as anticipated. She expressed understanding with the diagnosis(es) and plan. Guanakito Frank, was evaluated through a synchronous (real-time) audio-video encounter. The patient (or guardian if applicable) is aware that this is a billable service, which includes applicable co-pays. This Virtual Visit was conducted with patient's (and/or legal guardian's) consent. The visit was conducted pursuant to the emergency declaration under the [address] [place] [address] authority and the [employer] General Act. Patient identification was verified, and a caregiver was present when appropriate. The patient was located at: Home: [address] [place] [address]  The provider was located at:  [facility] [id number]        Cristina Garcia MD

## 2023-05-19 RX ORDER — SIMVASTATIN 5 MG
TABLET ORAL NIGHTLY
Qty: 30 TABLET | Refills: 0 | Status: SHIPPED | OUTPATIENT
Start: 2023-05-19 | End: 2023-06-15 | Stop reason: SDUPTHER

## 2023-05-19 RX ORDER — TRAZODONE HYDROCHLORIDE 50 MG/1
TABLET ORAL NIGHTLY
Qty: 30 TABLET | Refills: 0 | Status: SHIPPED | OUTPATIENT
Start: 2023-05-19 | End: 2023-06-15 | Stop reason: SDUPTHER

## 2023-06-19 DIAGNOSIS — E78.5 HYPERLIPIDEMIA, UNSPECIFIED HYPERLIPIDEMIA TYPE: ICD-10-CM

## 2023-06-19 RX ORDER — SIMVASTATIN 5 MG
5 TABLET ORAL NIGHTLY
Qty: 90 TABLET | Refills: 3 | Status: SHIPPED | OUTPATIENT
Start: 2023-06-19

## 2023-07-10 ENCOUNTER — TELEPHONE (OUTPATIENT)
Dept: FAMILY MEDICINE CLINIC | Age: 68
End: 2023-07-10

## 2023-07-10 NOTE — TELEPHONE ENCOUNTER
Patient states that she has concerns regarding her Anion Gap being low since she has the condition of MGUS. She would like to have her labs redone to validate the lab was done correctly.   She can be reached at [phone]

## 2023-07-12 NOTE — TELEPHONE ENCOUNTER
I have called and spoke to this patient.   Per Dr Benny Beard:  Call pt, the Anion gap has been a little low the last three times we have done the blood test   I do think a fourth time does not help us   She needs to F/U with her Hematologist/Oncologist as directed by them   BW Small     Patient verbalizes understanding, she will call her Hematologist/Oncologist.

## 2023-11-27 DIAGNOSIS — G47.00 INSOMNIA, UNSPECIFIED TYPE: ICD-10-CM

## 2023-11-28 ENCOUNTER — COMMUNITY OUTREACH (OUTPATIENT)
Dept: FAMILY MEDICINE CLINIC | Age: 68
End: 2023-11-28

## 2023-11-28 RX ORDER — TRAZODONE HYDROCHLORIDE 50 MG/1
TABLET ORAL
Qty: 90 TABLET | Refills: 0 | Status: SHIPPED | OUTPATIENT
Start: 2023-11-28

## 2024-04-25 ENCOUNTER — OFFICE VISIT (OUTPATIENT)
Dept: FAMILY MEDICINE CLINIC | Age: 69
End: 2024-04-25
Payer: MEDICARE

## 2024-04-25 VITALS
HEIGHT: 64 IN | SYSTOLIC BLOOD PRESSURE: 136 MMHG | DIASTOLIC BLOOD PRESSURE: 86 MMHG | WEIGHT: 128 LBS | HEART RATE: 50 BPM | OXYGEN SATURATION: 98 % | BODY MASS INDEX: 21.85 KG/M2 | TEMPERATURE: 97.7 F | RESPIRATION RATE: 12 BRPM

## 2024-04-25 DIAGNOSIS — J02.9 SORE THROAT: ICD-10-CM

## 2024-04-25 DIAGNOSIS — M54.50 LOW BACK PAIN WITHOUT SCIATICA, UNSPECIFIED BACK PAIN LATERALITY, UNSPECIFIED CHRONICITY: ICD-10-CM

## 2024-04-25 DIAGNOSIS — M54.2 NECK PAIN: ICD-10-CM

## 2024-04-25 DIAGNOSIS — E78.5 HYPERLIPIDEMIA, UNSPECIFIED HYPERLIPIDEMIA TYPE: Primary | ICD-10-CM

## 2024-04-25 DIAGNOSIS — I10 PRIMARY HYPERTENSION: ICD-10-CM

## 2024-04-25 DIAGNOSIS — G47.25 SLEEP DISORDER, CIRCADIAN, JET LAG TYPE: ICD-10-CM

## 2024-04-25 DIAGNOSIS — G47.00 INSOMNIA, UNSPECIFIED TYPE: ICD-10-CM

## 2024-04-25 LAB
ANION GAP SERPL CALC-SCNC: 7 MMOL/L (ref 5–15)
BUN SERPL-MCNC: 13 MG/DL (ref 6–20)
BUN/CREAT SERPL: 20 (ref 12–20)
CALCIUM SERPL-MCNC: 9 MG/DL (ref 8.5–10.1)
CHLORIDE SERPL-SCNC: 107 MMOL/L (ref 97–108)
CHOLEST SERPL-MCNC: 177 MG/DL
CO2 SERPL-SCNC: 28 MMOL/L (ref 21–32)
CREAT SERPL-MCNC: 0.65 MG/DL (ref 0.55–1.02)
EXP DATE SOLUTION: NORMAL
EXP DATE SWAB: NORMAL
EXPIRATION DATE: NORMAL
GLUCOSE SERPL-MCNC: 98 MG/DL (ref 65–100)
GROUP A STREP ANTIGEN, POC: NEGATIVE
HDLC SERPL-MCNC: 63 MG/DL
HDLC SERPL: 2.8 (ref 0–5)
LDLC SERPL CALC-MCNC: 95.8 MG/DL (ref 0–100)
LOT NUMBER POC: NORMAL
LOT NUMBER SOLUTION: NORMAL
LOT NUMBER SWAB: NORMAL
POTASSIUM SERPL-SCNC: 4.6 MMOL/L (ref 3.5–5.1)
SARS-COV-2 RNA, POC: NEGATIVE
SODIUM SERPL-SCNC: 142 MMOL/L (ref 136–145)
TRIGL SERPL-MCNC: 91 MG/DL
VALID INTERNAL CONTROL, POC: YES
VLDLC SERPL CALC-MCNC: 18.2 MG/DL

## 2024-04-25 PROCEDURE — 87635 SARS-COV-2 COVID-19 AMP PRB: CPT | Performed by: FAMILY MEDICINE

## 2024-04-25 PROCEDURE — 1090F PRES/ABSN URINE INCON ASSESS: CPT | Performed by: FAMILY MEDICINE

## 2024-04-25 PROCEDURE — 3079F DIAST BP 80-89 MM HG: CPT | Performed by: FAMILY MEDICINE

## 2024-04-25 PROCEDURE — G8427 DOCREV CUR MEDS BY ELIG CLIN: HCPCS | Performed by: FAMILY MEDICINE

## 2024-04-25 PROCEDURE — 4004F PT TOBACCO SCREEN RCVD TLK: CPT | Performed by: FAMILY MEDICINE

## 2024-04-25 PROCEDURE — 87880 STREP A ASSAY W/OPTIC: CPT | Performed by: FAMILY MEDICINE

## 2024-04-25 PROCEDURE — 3075F SYST BP GE 130 - 139MM HG: CPT | Performed by: FAMILY MEDICINE

## 2024-04-25 PROCEDURE — 3017F COLORECTAL CA SCREEN DOC REV: CPT | Performed by: FAMILY MEDICINE

## 2024-04-25 PROCEDURE — 1123F ACP DISCUSS/DSCN MKR DOCD: CPT | Performed by: FAMILY MEDICINE

## 2024-04-25 PROCEDURE — 99214 OFFICE O/P EST MOD 30 MIN: CPT | Performed by: FAMILY MEDICINE

## 2024-04-25 PROCEDURE — G8420 CALC BMI NORM PARAMETERS: HCPCS | Performed by: FAMILY MEDICINE

## 2024-04-25 PROCEDURE — G8399 PT W/DXA RESULTS DOCUMENT: HCPCS | Performed by: FAMILY MEDICINE

## 2024-04-25 RX ORDER — ZOLPIDEM TARTRATE 10 MG/1
TABLET ORAL NIGHTLY PRN
COMMUNITY
End: 2024-04-25 | Stop reason: SDUPTHER

## 2024-04-25 RX ORDER — SIMVASTATIN 5 MG
5 TABLET ORAL NIGHTLY
Qty: 90 TABLET | Refills: 3 | Status: SHIPPED | OUTPATIENT
Start: 2024-04-25

## 2024-04-25 RX ORDER — ZOLPIDEM TARTRATE 10 MG/1
10 TABLET ORAL NIGHTLY PRN
Qty: 15 TABLET | Refills: 0 | Status: SHIPPED | OUTPATIENT
Start: 2024-04-25 | End: 2024-05-10

## 2024-04-25 RX ORDER — TRAZODONE HYDROCHLORIDE 50 MG/1
TABLET ORAL
Qty: 90 TABLET | Refills: 1 | Status: SHIPPED | OUTPATIENT
Start: 2024-04-25

## 2024-04-25 ASSESSMENT — PATIENT HEALTH QUESTIONNAIRE - PHQ9
2. FEELING DOWN, DEPRESSED OR HOPELESS: NOT AT ALL
SUM OF ALL RESPONSES TO PHQ QUESTIONS 1-9: 0
SUM OF ALL RESPONSES TO PHQ9 QUESTIONS 1 & 2: 0
SUM OF ALL RESPONSES TO PHQ QUESTIONS 1-9: 0
1. LITTLE INTEREST OR PLEASURE IN DOING THINGS: NOT AT ALL

## 2024-04-25 ASSESSMENT — ENCOUNTER SYMPTOMS
ABDOMINAL PAIN: 0
SORE THROAT: 1
BACK PAIN: 1
COUGH: 0

## 2024-04-25 NOTE — PROGRESS NOTES
\"Have you been to the ER, urgent care clinic since your last visit?  Hospitalized since your last visit?\"    NO    “Have you seen or consulted any other health care providers outside of [facility] since your last visit?”    YES - When: approximately 2  weeks ago.  Where and Why: [facility].            Click Here for Release of Records Request    
Allergies    Current Outpatient Medications   Medication Sig Dispense Refill    simvastatin (ZOCOR) 5 MG tablet Take 1 tablet by mouth nightly 90 tablet 3    traZODone (DESYREL) 50 MG tablet TAKE ONE (1) TABLET BY MOUTH EVERY NIGHT 90 tablet 1    zolpidem (AMBIEN) 10 MG tablet Take 1 tablet by mouth nightly as needed for Sleep for up to 15 days. When traveling Max Daily Amount: 10 mg 15 tablet 0    Zoledronic Acid (RECLAST IV) Infuse intravenously      vitamin D (CHOLECALCIFEROL) 25 MCG (1000 UT) TABS tablet Take by mouth daily      fluticasone (FLONASE) 50 MCG/ACT nasal spray 2 sprays by Nasal route as needed      ibuprofen (ADVIL;MOTRIN) 200 MG CAPS capsule Take by mouth      lisinopril (PRINIVIL;ZESTRIL) 5 MG tablet take 1 tablet by oral route every morning      metoprolol succinate (TOPROL XL) 25 MG extended release tablet Take 1 tablet by mouth daily      zinc gluconate 50 MG tablet Take 1 tablet by mouth       No current facility-administered medications for this visit.       Social History     Socioeconomic History    Marital status:    Tobacco Use    Smoking status: Never    Smokeless tobacco: Never   Vaping Use    Vaping Use: Never used   Substance and Sexual Activity    Alcohol use: Yes     Alcohol/week: 1.7 standard drinks of alcohol    Drug use: No   Social History Narrative    ** Merged History Encounter **            Family History   Problem Relation Age of Onset    Diabetes Brother     Cancer Maternal Grandmother     Cancer Maternal Grandfather     Emphysema Father     Diabetes Sister     Obesity Mother          Physical Exam:  /86 (Site: Right Upper Arm, Position: Sitting, Cuff Size: Medium Adult)   Pulse 50   Temp 97.7 °F (36.5 °C)   Resp 12   Ht 1.626 m (5' 4\")   Wt 58.1 kg (128 lb)   SpO2 98%   BMI 21.97 kg/m²   Physical Exam  Vitals and nursing note reviewed.   Constitutional:       Appearance: She is normal weight.   HENT:      Head: Normocephalic and atraumatic.      Right

## 2024-05-23 DIAGNOSIS — G47.25 SLEEP DISORDER, CIRCADIAN, JET LAG TYPE: ICD-10-CM

## 2024-05-23 RX ORDER — ZOLPIDEM TARTRATE 10 MG/1
10 TABLET ORAL NIGHTLY PRN
Qty: 10 TABLET | Refills: 0 | Status: SHIPPED | OUTPATIENT
Start: 2024-05-23 | End: 2024-06-07

## 2024-05-23 NOTE — TELEPHONE ENCOUNTER
Requested Prescriptions     Pending Prescriptions Disp Refills    zolpidem (AMBIEN) 10 MG tablet [Pharmacy Med Name: ZOLPIDEM TARTRATE 10 MG TABLET] 15 tablet 0     Sig: Take 1 tablet by mouth nightly as needed for Sleep for up to 15 days. When traveling Max Daily Amount: 10 mg     Last seen:  4/25/24

## 2024-11-14 DIAGNOSIS — G47.00 INSOMNIA, UNSPECIFIED TYPE: ICD-10-CM

## 2024-11-14 RX ORDER — TRAZODONE HYDROCHLORIDE 50 MG/1
TABLET, FILM COATED ORAL
Qty: 30 TABLET | Refills: 0 | Status: SHIPPED | OUTPATIENT
Start: 2024-11-14

## 2024-11-14 NOTE — TELEPHONE ENCOUNTER
Patient requesting refill on     Requested Prescriptions     Pending Prescriptions Disp Refills    traZODone (DESYREL) 50 MG tablet [Pharmacy Med Name: TRAZODONE 50 MG TABLET] 90 tablet 0     Sig: TAKE ONE (1) TABLET BY MOUTH EVERY NIGHT        Last OV 4/25/2024

## 2024-12-02 ENCOUNTER — OFFICE VISIT (OUTPATIENT)
Dept: FAMILY MEDICINE CLINIC | Age: 69
End: 2024-12-02
Payer: MEDICARE

## 2024-12-02 VITALS
SYSTOLIC BLOOD PRESSURE: 128 MMHG | WEIGHT: 125 LBS | DIASTOLIC BLOOD PRESSURE: 80 MMHG | HEART RATE: 56 BPM | HEIGHT: 64 IN | RESPIRATION RATE: 12 BRPM | TEMPERATURE: 98 F | BODY MASS INDEX: 21.34 KG/M2 | OXYGEN SATURATION: 96 %

## 2024-12-02 DIAGNOSIS — G47.00 INSOMNIA, UNSPECIFIED TYPE: ICD-10-CM

## 2024-12-02 DIAGNOSIS — Z00.00 MEDICARE ANNUAL WELLNESS VISIT, SUBSEQUENT: Primary | ICD-10-CM

## 2024-12-02 DIAGNOSIS — I10 PRIMARY HYPERTENSION: ICD-10-CM

## 2024-12-02 DIAGNOSIS — E78.5 HYPERLIPIDEMIA, UNSPECIFIED HYPERLIPIDEMIA TYPE: ICD-10-CM

## 2024-12-02 DIAGNOSIS — G47.25 SLEEP DISORDER, CIRCADIAN, JET LAG TYPE: ICD-10-CM

## 2024-12-02 DIAGNOSIS — M79.662 PAIN IN LEFT SHIN: ICD-10-CM

## 2024-12-02 DIAGNOSIS — R73.09 ELEVATED GLUCOSE: ICD-10-CM

## 2024-12-02 PROCEDURE — 36415 COLL VENOUS BLD VENIPUNCTURE: CPT | Performed by: FAMILY MEDICINE

## 2024-12-02 PROCEDURE — G0439 PPPS, SUBSEQ VISIT: HCPCS | Performed by: FAMILY MEDICINE

## 2024-12-02 PROCEDURE — 1159F MED LIST DOCD IN RCRD: CPT | Performed by: FAMILY MEDICINE

## 2024-12-02 PROCEDURE — 1123F ACP DISCUSS/DSCN MKR DOCD: CPT | Performed by: FAMILY MEDICINE

## 2024-12-02 PROCEDURE — 1126F AMNT PAIN NOTED NONE PRSNT: CPT | Performed by: FAMILY MEDICINE

## 2024-12-02 PROCEDURE — G8399 PT W/DXA RESULTS DOCUMENT: HCPCS | Performed by: FAMILY MEDICINE

## 2024-12-02 PROCEDURE — 1160F RVW MEDS BY RX/DR IN RCRD: CPT | Performed by: FAMILY MEDICINE

## 2024-12-02 PROCEDURE — 99214 OFFICE O/P EST MOD 30 MIN: CPT | Performed by: FAMILY MEDICINE

## 2024-12-02 PROCEDURE — 3079F DIAST BP 80-89 MM HG: CPT | Performed by: FAMILY MEDICINE

## 2024-12-02 PROCEDURE — G8420 CALC BMI NORM PARAMETERS: HCPCS | Performed by: FAMILY MEDICINE

## 2024-12-02 PROCEDURE — G8484 FLU IMMUNIZE NO ADMIN: HCPCS | Performed by: FAMILY MEDICINE

## 2024-12-02 PROCEDURE — 3074F SYST BP LT 130 MM HG: CPT | Performed by: FAMILY MEDICINE

## 2024-12-02 PROCEDURE — G8427 DOCREV CUR MEDS BY ELIG CLIN: HCPCS | Performed by: FAMILY MEDICINE

## 2024-12-02 PROCEDURE — 4004F PT TOBACCO SCREEN RCVD TLK: CPT | Performed by: FAMILY MEDICINE

## 2024-12-02 PROCEDURE — 1090F PRES/ABSN URINE INCON ASSESS: CPT | Performed by: FAMILY MEDICINE

## 2024-12-02 PROCEDURE — 3017F COLORECTAL CA SCREEN DOC REV: CPT | Performed by: FAMILY MEDICINE

## 2024-12-02 RX ORDER — ZOLPIDEM TARTRATE 10 MG/1
10 TABLET ORAL NIGHTLY PRN
Qty: 15 TABLET | Refills: 0 | Status: SHIPPED | OUTPATIENT
Start: 2024-12-02 | End: 2024-12-17

## 2024-12-02 RX ORDER — TRAZODONE HYDROCHLORIDE 50 MG/1
TABLET, FILM COATED ORAL
Qty: 90 TABLET | Refills: 1 | Status: SHIPPED | OUTPATIENT
Start: 2024-12-02

## 2024-12-02 ASSESSMENT — PATIENT HEALTH QUESTIONNAIRE - PHQ9
SUM OF ALL RESPONSES TO PHQ QUESTIONS 1-9: 0
1. LITTLE INTEREST OR PLEASURE IN DOING THINGS: NOT AT ALL
SUM OF ALL RESPONSES TO PHQ QUESTIONS 1-9: 0
2. FEELING DOWN, DEPRESSED OR HOPELESS: NOT AT ALL
SUM OF ALL RESPONSES TO PHQ9 QUESTIONS 1 & 2: 0

## 2024-12-02 ASSESSMENT — LIFESTYLE VARIABLES
HOW MANY STANDARD DRINKS CONTAINING ALCOHOL DO YOU HAVE ON A TYPICAL DAY: 1 OR 2
HOW OFTEN DO YOU HAVE A DRINK CONTAINING ALCOHOL: 4 OR MORE TIMES A WEEK

## 2024-12-02 ASSESSMENT — ENCOUNTER SYMPTOMS
GASTROINTESTINAL NEGATIVE: 1
COUGH: 0

## 2024-12-02 NOTE — PROGRESS NOTES
Jaylin Garcia is a 69 y.o. female who presents to the office today with the following:  Chief Complaint   Patient presents with   • Medicare AWV            HPI  HM reviewed    HTN  BP is good    Had Afib once when in [place]  On ASA then  No other Blood thinner  Has checked with card    Hyperlipidemia  Fasting for BW    Hx of elevated Glucose  And FH of DM  Would like HbA1C checked    Insomnia  Needs Trazodone  And Ambien for international travel    Pain in bone in left shin  Not in the muscle  Deep ache      Review of Systems   Constitutional:  Negative for unexpected weight change.   HENT:  Negative for congestion.    Respiratory:  Negative for cough.    Cardiovascular:  Negative for chest pain and palpitations.   Gastrointestinal: Negative.    Genitourinary: Negative.    Musculoskeletal:         Deep ache in left lateral shin   Neurological:  Negative for dizziness and headaches.       See HPI.    Past Medical History:   Diagnosis Date   • BCC (basal cell carcinoma of skin) 02/2022    with skin graft, on forehead   • Cervical stenosis of spinal canal    • Chronic UTI    • Hypercholesterolemia     Hyperlipidemia   • Hypertension    • MGUS (monoclonal gammopathy of unknown significance)    • Microhematuria     seen by Dr Orona   • MVP (mitral valve prolapse)     Tricuspid regurgitation   • Osteoporosis 2015.01.02    bone density exam   • Paroxysmal A-fib (HCC)    • PSVT (paroxysmal supraventricular tachycardia) ([facility])        Past Surgical History:   Procedure Laterality Date   • BREAST BIOPSY     • BREAST SURGERY  2003   • CHEST SURGERY  as infant    pectus excavatum   • COLONOSCOPY  08/24/2006 and 1/18    scanned, every 10 y   • NEUROLOGICAL SURGERY      cervical disc fusion 1/2006   • ORTHOPEDIC SURGERY     • TONSILLECTOMY     • UPPER GASTROINTESTINAL ENDOSCOPY  08/06       No Known Allergies    Current Outpatient Medications   Medication Sig Dispense Refill   • traZODone (DESYREL) 50 MG tablet TAKE ONE (1)

## 2024-12-03 LAB
ALBUMIN SERPL-MCNC: 3.6 G/DL (ref 3.5–5)
ALBUMIN/GLOB SERPL: 1.3 (ref 1.1–2.2)
ALP SERPL-CCNC: 54 U/L (ref 45–117)
ALT SERPL-CCNC: 19 U/L (ref 12–78)
ANION GAP SERPL CALC-SCNC: 2 MMOL/L (ref 2–12)
AST SERPL-CCNC: 14 U/L (ref 15–37)
BASOPHILS # BLD: 0.1 K/UL (ref 0–0.1)
BASOPHILS NFR BLD: 2 % (ref 0–1)
BILIRUB SERPL-MCNC: 0.9 MG/DL (ref 0.2–1)
BUN SERPL-MCNC: 16 MG/DL (ref 6–20)
BUN/CREAT SERPL: 21 (ref 12–20)
CALCIUM SERPL-MCNC: 8.6 MG/DL (ref 8.5–10.1)
CHLORIDE SERPL-SCNC: 108 MMOL/L (ref 97–108)
CHOLEST SERPL-MCNC: 201 MG/DL
CO2 SERPL-SCNC: 31 MMOL/L (ref 21–32)
CREAT SERPL-MCNC: 0.77 MG/DL (ref 0.55–1.02)
DIFFERENTIAL METHOD BLD: ABNORMAL
EOSINOPHIL # BLD: 0.2 K/UL (ref 0–0.4)
EOSINOPHIL NFR BLD: 5 % (ref 0–7)
ERYTHROCYTE [DISTWIDTH] IN BLOOD BY AUTOMATED COUNT: 11.8 % (ref 11.5–14.5)
EST. AVERAGE GLUCOSE BLD GHB EST-MCNC: 97 MG/DL
GLOBULIN SER CALC-MCNC: 2.8 G/DL (ref 2–4)
GLUCOSE SERPL-MCNC: 119 MG/DL (ref 65–100)
HBA1C MFR BLD: 5 % (ref 4–5.6)
HCT VFR BLD AUTO: 43.2 % (ref 35–47)
HDLC SERPL-MCNC: 66 MG/DL
HDLC SERPL: 3 (ref 0–5)
HGB BLD-MCNC: 14.1 G/DL (ref 11.5–16)
IMM GRANULOCYTES # BLD AUTO: 0 K/UL (ref 0–0.04)
IMM GRANULOCYTES NFR BLD AUTO: 0 % (ref 0–0.5)
LDLC SERPL CALC-MCNC: 114.4 MG/DL (ref 0–100)
LYMPHOCYTES # BLD: 1.3 K/UL (ref 0.8–3.5)
LYMPHOCYTES NFR BLD: 33 % (ref 12–49)
MCH RBC QN AUTO: 31.7 PG (ref 26–34)
MCHC RBC AUTO-ENTMCNC: 32.6 G/DL (ref 30–36.5)
MCV RBC AUTO: 97.1 FL (ref 80–99)
MONOCYTES # BLD: 0.5 K/UL (ref 0–1)
MONOCYTES NFR BLD: 13 % (ref 5–13)
NEUTS SEG # BLD: 1.9 K/UL (ref 1.8–8)
NEUTS SEG NFR BLD: 47 % (ref 32–75)
NRBC # BLD: 0 K/UL (ref 0–0.01)
NRBC BLD-RTO: 0 PER 100 WBC
PLATELET # BLD AUTO: 187 K/UL (ref 150–400)
PMV BLD AUTO: 10 FL (ref 8.9–12.9)
POTASSIUM SERPL-SCNC: 4.3 MMOL/L (ref 3.5–5.1)
PROT SERPL-MCNC: 6.4 G/DL (ref 6.4–8.2)
RBC # BLD AUTO: 4.45 M/UL (ref 3.8–5.2)
SODIUM SERPL-SCNC: 141 MMOL/L (ref 136–145)
TRIGL SERPL-MCNC: 103 MG/DL
VLDLC SERPL CALC-MCNC: 20.6 MG/DL
WBC # BLD AUTO: 4 K/UL (ref 3.6–11)

## 2024-12-31 ENCOUNTER — TELEPHONE (OUTPATIENT)
Dept: FAMILY MEDICINE CLINIC | Age: 69
End: 2024-12-31

## 2025-04-17 DIAGNOSIS — E78.5 HYPERLIPIDEMIA, UNSPECIFIED HYPERLIPIDEMIA TYPE: ICD-10-CM

## 2025-04-17 RX ORDER — SIMVASTATIN 5 MG
TABLET ORAL
Qty: 90 TABLET | Refills: 1 | Status: SHIPPED | OUTPATIENT
Start: 2025-04-17

## 2025-04-17 NOTE — TELEPHONE ENCOUNTER
Patient requesting refill on     Requested Prescriptions     Pending Prescriptions Disp Refills    simvastatin (ZOCOR) 5 MG tablet [Pharmacy Med Name: SIMVASTATIN 5 MG TABLET] 90 tablet 0     Sig: TAKE ONE (1) TABLET BY MOUTH NIGHTLY        Last OV 12/2/2024

## 2025-07-21 PROCEDURE — 36415 COLL VENOUS BLD VENIPUNCTURE: CPT

## 2025-07-21 PROCEDURE — 93005 ELECTROCARDIOGRAM TRACING: CPT | Performed by: EMERGENCY MEDICINE

## 2025-07-21 PROCEDURE — 85025 COMPLETE CBC W/AUTO DIFF WBC: CPT

## 2025-07-21 PROCEDURE — 99284 EMERGENCY DEPT VISIT MOD MDM: CPT

## 2025-07-21 PROCEDURE — 80053 COMPREHEN METABOLIC PANEL: CPT

## 2025-07-21 PROCEDURE — 84484 ASSAY OF TROPONIN QUANT: CPT

## 2025-07-21 ASSESSMENT — PAIN - FUNCTIONAL ASSESSMENT
PAIN_FUNCTIONAL_ASSESSMENT: 0-10
PAIN_FUNCTIONAL_ASSESSMENT: ACTIVITIES ARE NOT PREVENTED

## 2025-07-21 ASSESSMENT — PAIN SCALES - GENERAL: PAINLEVEL_OUTOF10: 3

## 2025-07-21 ASSESSMENT — PAIN DESCRIPTION - DESCRIPTORS: DESCRIPTORS: TIGHTNESS

## 2025-07-21 ASSESSMENT — PAIN DESCRIPTION - FREQUENCY: FREQUENCY: CONTINUOUS

## 2025-07-21 ASSESSMENT — PAIN DESCRIPTION - ONSET: ONSET: PROGRESSIVE

## 2025-07-21 ASSESSMENT — PAIN DESCRIPTION - LOCATION: LOCATION: CHEST

## 2025-07-21 ASSESSMENT — PAIN DESCRIPTION - PAIN TYPE: TYPE: ACUTE PAIN

## 2025-07-21 ASSESSMENT — PAIN DESCRIPTION - ORIENTATION: ORIENTATION: LEFT;UPPER

## 2025-07-22 ENCOUNTER — HOSPITAL ENCOUNTER (EMERGENCY)
Facility: HOSPITAL | Age: 70
Discharge: HOME | End: 2025-07-22
Attending: EMERGENCY MEDICINE
Payer: MEDICARE

## 2025-07-22 VITALS
RESPIRATION RATE: 12 BRPM | BODY MASS INDEX: 21.34 KG/M2 | DIASTOLIC BLOOD PRESSURE: 72 MMHG | TEMPERATURE: 97.7 F | SYSTOLIC BLOOD PRESSURE: 154 MMHG | HEART RATE: 60 BPM | OXYGEN SATURATION: 96 % | HEIGHT: 64 IN | WEIGHT: 125 LBS

## 2025-07-22 DIAGNOSIS — M54.12 CERVICAL RADICULOPATHY: ICD-10-CM

## 2025-07-22 DIAGNOSIS — N17.9 AKI (ACUTE KIDNEY INJURY): ICD-10-CM

## 2025-07-22 DIAGNOSIS — I10 PRIMARY HYPERTENSION: Primary | ICD-10-CM

## 2025-07-22 DIAGNOSIS — M54.16 LUMBAR RADICULOPATHY: ICD-10-CM

## 2025-07-22 LAB
ALBUMIN SERPL-MCNC: 3.7 G/DL (ref 3.5–5)
ALBUMIN/GLOB SERPL: 1 (ref 1.1–2.2)
ALP SERPL-CCNC: 85 U/L (ref 45–117)
ALT SERPL-CCNC: 22 U/L (ref 12–78)
ANION GAP SERPL CALC-SCNC: 2 MMOL/L (ref 2–12)
APPEARANCE UR: ABNORMAL
AST SERPL-CCNC: 19 U/L (ref 15–37)
BACTERIA URNS QL MICRO: NEGATIVE /HPF
BASOPHILS # BLD: 0.04 K/UL (ref 0–0.1)
BASOPHILS NFR BLD: 0.7 % (ref 0–1)
BILIRUB SERPL-MCNC: 0.5 MG/DL (ref 0.2–1)
BILIRUB UR QL: NEGATIVE
BUN SERPL-MCNC: 16 MG/DL (ref 6–20)
BUN/CREAT SERPL: 11 (ref 12–20)
CALCIUM SERPL-MCNC: 9 MG/DL (ref 8.5–10.1)
CHLORIDE SERPL-SCNC: 107 MMOL/L (ref 97–108)
CO2 SERPL-SCNC: 32 MMOL/L (ref 21–32)
COLOR UR: ABNORMAL
CREAT SERPL-MCNC: 1.49 MG/DL (ref 0.55–1.02)
DIFFERENTIAL METHOD BLD: NORMAL
EKG ATRIAL RATE: 74 BPM
EKG DIAGNOSIS: NORMAL
EKG P AXIS: 79 DEGREES
EKG P-R INTERVAL: 200 MS
EKG Q-T INTERVAL: 414 MS
EKG QRS DURATION: 82 MS
EKG QTC CALCULATION (BAZETT): 459 MS
EKG R AXIS: 83 DEGREES
EKG T AXIS: 39 DEGREES
EKG VENTRICULAR RATE: 74 BPM
EOSINOPHIL # BLD: 0.14 K/UL (ref 0–0.4)
EOSINOPHIL NFR BLD: 2.4 % (ref 0–7)
EPITH CASTS URNS QL MICRO: ABNORMAL /LPF
ERYTHROCYTE [DISTWIDTH] IN BLOOD BY AUTOMATED COUNT: 12.3 % (ref 11.5–14.5)
GLOBULIN SER CALC-MCNC: 3.6 G/DL (ref 2–4)
GLUCOSE SERPL-MCNC: 106 MG/DL (ref 65–100)
GLUCOSE UR STRIP.AUTO-MCNC: NEGATIVE MG/DL
HCT VFR BLD AUTO: 43.5 % (ref 35–47)
HGB BLD-MCNC: 14.4 G/DL (ref 11.5–16)
HGB UR QL STRIP: NEGATIVE
HYALINE CASTS URNS QL MICRO: ABNORMAL /LPF (ref 0–2)
IMM GRANULOCYTES # BLD AUTO: 0.01 K/UL (ref 0–0.04)
IMM GRANULOCYTES NFR BLD AUTO: 0.2 % (ref 0–0.5)
KETONES UR QL STRIP.AUTO: NEGATIVE MG/DL
LEUKOCYTE ESTERASE UR QL STRIP.AUTO: ABNORMAL
LYMPHOCYTES # BLD: 1.77 K/UL (ref 0.8–3.5)
LYMPHOCYTES NFR BLD: 30.4 % (ref 12–49)
MCH RBC QN AUTO: 32.3 PG (ref 26–34)
MCHC RBC AUTO-ENTMCNC: 33.1 G/DL (ref 30–36.5)
MCV RBC AUTO: 97.5 FL (ref 80–99)
MONOCYTES # BLD: 0.71 K/UL (ref 0–1)
MONOCYTES NFR BLD: 12.2 % (ref 5–13)
NEUTS SEG # BLD: 3.16 K/UL (ref 1.8–8)
NEUTS SEG NFR BLD: 54.1 % (ref 32–75)
NITRITE UR QL STRIP.AUTO: NEGATIVE
NRBC # BLD: 0 K/UL (ref 0–0.01)
NRBC BLD-RTO: 0 PER 100 WBC
PH UR STRIP: 8 (ref 5–8)
PLATELET # BLD AUTO: 159 K/UL (ref 150–400)
PMV BLD AUTO: 9.3 FL (ref 8.9–12.9)
POTASSIUM SERPL-SCNC: 3.9 MMOL/L (ref 3.5–5.1)
PROT SERPL-MCNC: 7.3 G/DL (ref 6.4–8.2)
PROT UR STRIP-MCNC: NEGATIVE MG/DL
RBC # BLD AUTO: 4.46 M/UL (ref 3.8–5.2)
RBC #/AREA URNS HPF: ABNORMAL /HPF (ref 0–5)
SODIUM SERPL-SCNC: 141 MMOL/L (ref 136–145)
SP GR UR REFRACTOMETRY: 1.01
TROPONIN I SERPL HS-MCNC: 6 NG/L (ref 0–51)
URINE CULTURE IF INDICATED: ABNORMAL
UROBILINOGEN UR QL STRIP.AUTO: 1 EU/DL (ref 0.2–1)
WBC # BLD AUTO: 5.8 K/UL (ref 3.6–11)
WBC URNS QL MICRO: ABNORMAL /HPF (ref 0–4)

## 2025-07-22 PROCEDURE — 6370000000 HC RX 637 (ALT 250 FOR IP): Performed by: EMERGENCY MEDICINE

## 2025-07-22 PROCEDURE — 81001 URINALYSIS AUTO W/SCOPE: CPT

## 2025-07-22 RX ORDER — GABAPENTIN 100 MG/1
100 CAPSULE ORAL 3 TIMES DAILY PRN
Qty: 30 CAPSULE | Refills: 0 | Status: SHIPPED | OUTPATIENT
Start: 2025-07-22 | End: 2025-08-05

## 2025-07-22 RX ORDER — METOPROLOL SUCCINATE 25 MG/1
25 TABLET, EXTENDED RELEASE ORAL ONCE
Status: COMPLETED | OUTPATIENT
Start: 2025-07-22 | End: 2025-07-22

## 2025-07-22 RX ORDER — LISINOPRIL 5 MG/1
5 TABLET ORAL
Status: COMPLETED | OUTPATIENT
Start: 2025-07-22 | End: 2025-07-22

## 2025-07-22 RX ADMIN — METOPROLOL SUCCINATE 25 MG: 25 TABLET, EXTENDED RELEASE ORAL at 02:35

## 2025-07-22 RX ADMIN — LISINOPRIL 5 MG: 5 TABLET ORAL at 02:35

## 2025-07-22 NOTE — DISCHARGE INSTRUCTIONS
It was a pleasure taking care of you in our Emergency Department today.  We know that when you come to [facility], you are entrusting us with your health, comfort, and safety.  Our physicians and nurses honor that trust, and truly appreciate the opportunity to care for you and your loved ones.    We also value your feedback.  If you receive a survey about your Emergency Department experience today, please fill it out.  We care about our patients' feedback, and we listen to what you have to say.  Thank you!       --- Dr. Kiara Garduno MD

## 2025-07-22 NOTE — ED PROVIDER NOTES
[facility] EMERGENCY DEPARTMENT  EMERGENCY DEPARTMENT ENCOUNTER         Pt Name: Jaylin Garcia  MRN: [id number]  Birthdate 1955  Date of evaluation: 7/21/2025  Provider: Kiara Garduno MD   PCP: Katey Andrew MD  Note Started: 4:58 AM 7/22/25     CHIEF COMPLAINT       Chief Complaint   Patient presents with    Hypertension     Patient ambulatory to triage with hypertension noted today while visiting PCP for nerve pain. Patient reports chest tightness at home and checked her blood pressure again, observed SBP > 200. Patient takes lisinopril and is compliant with medications.         HISTORY OF PRESENT ILLNESS: 1 or more elements      History From: Patient  HPI Limitations: None     Jaylin Garcia is a 69 y.o. female whose medical history is listed below presents with multipl elevated bp readings. She has a history of HTN and is prescribed metoprolol and lisinopril. She has that historically she has always had normal blood pressures. She is comopliant with her meds but has not taken this eveing's doses because she has been here.  She denies having cp, sob, palpitations. She noted the elevated bp during a doctor visit today where she went because she had developed symptoms of cervical and lumbar radiculopathy, both of which she has had previously. Initially was told there that BP was elevated. She went home and was concerned about her bp, checked it again and noted it again to be high. Checked it a third time but BP continued to be high in 190s and 200s systolic. This is when she came in for an evaluation.  No other symptoms or concerns and ROS is otherwise negative.   Pt denies any other exacerbating or ameliorating factors. There are no other complaints, changes or physical findings pertinent to the HPI at this time.     PAST HISTORY     Past Medical History:  Past Medical History:   Diagnosis Date    BCC (basal cell carcinoma of skin) 02/2022    with skin graft, on forehead    Cervical

## 2025-07-22 NOTE — ED NOTES
Patient/Family is discharged to home at this time. Patient/Family is awake, alert, and oriented.  Respirations are normal with no current sign of distress.  RN reviewed discharge instructions with the patient/family, and patient/family verbalized understanding. All of the patients/family questions and concerns were addressed. The patient is discharged with papers in hand. Patient/family were made aware of prescription(s) called into pharmacy for .

## 2025-07-26 ASSESSMENT — ENCOUNTER SYMPTOMS
COUGH: 0
COLOR CHANGE: 0
VOMITING: 0
BACK PAIN: 0
SHORTNESS OF BREATH: 0
ABDOMINAL PAIN: 0
DIARRHEA: 0
NAUSEA: 0